# Patient Record
Sex: FEMALE | Race: WHITE | HISPANIC OR LATINO | Employment: UNEMPLOYED | URBAN - METROPOLITAN AREA
[De-identification: names, ages, dates, MRNs, and addresses within clinical notes are randomized per-mention and may not be internally consistent; named-entity substitution may affect disease eponyms.]

---

## 2017-01-09 ENCOUNTER — APPOINTMENT (OUTPATIENT)
Dept: LAB | Facility: HOSPITAL | Age: 37
End: 2017-01-09
Payer: COMMERCIAL

## 2017-01-09 ENCOUNTER — ALLSCRIPTS OFFICE VISIT (OUTPATIENT)
Dept: PERINATAL CARE | Facility: CLINIC | Age: 37
End: 2017-01-09
Payer: COMMERCIAL

## 2017-01-09 ENCOUNTER — GENERIC CONVERSION - ENCOUNTER (OUTPATIENT)
Dept: OTHER | Facility: OTHER | Age: 37
End: 2017-01-09

## 2017-01-09 DIAGNOSIS — O09.512 SUPERVISION OF ELDERLY PRIMIGRAVIDA IN SECOND TRIMESTER: ICD-10-CM

## 2017-01-09 LAB
BASOPHILS # BLD AUTO: 0.02 THOUSANDS/ΜL (ref 0–0.1)
BASOPHILS NFR BLD AUTO: 0 % (ref 0–1)
EOSINOPHIL # BLD AUTO: 0.11 THOUSAND/ΜL (ref 0–0.61)
EOSINOPHIL NFR BLD AUTO: 1 % (ref 0–6)
ERYTHROCYTE [DISTWIDTH] IN BLOOD BY AUTOMATED COUNT: 13.3 % (ref 11.6–15.1)
GLUCOSE 1H P 50 G GLC PO SERPL-MCNC: 149 MG/DL
HCT VFR BLD AUTO: 36.9 % (ref 34.8–46.1)
HGB BLD-MCNC: 12.2 G/DL (ref 11.5–15.4)
LYMPHOCYTES # BLD AUTO: 1.54 THOUSANDS/ΜL (ref 0.6–4.47)
LYMPHOCYTES NFR BLD AUTO: 14 % (ref 14–44)
MCH RBC QN AUTO: 30.1 PG (ref 26.8–34.3)
MCHC RBC AUTO-ENTMCNC: 33.1 G/DL (ref 31.4–37.4)
MCV RBC AUTO: 91 FL (ref 82–98)
MONOCYTES # BLD AUTO: 0.77 THOUSAND/ΜL (ref 0.17–1.22)
MONOCYTES NFR BLD AUTO: 7 % (ref 4–12)
NEUTROPHILS # BLD AUTO: 8.96 THOUSANDS/ΜL (ref 1.85–7.62)
NEUTS SEG NFR BLD AUTO: 78 % (ref 43–75)
NRBC BLD AUTO-RTO: 0 /100 WBCS
PLATELET # BLD AUTO: 246 THOUSANDS/UL (ref 149–390)
PMV BLD AUTO: 10.2 FL (ref 8.9–12.7)
RBC # BLD AUTO: 4.05 MILLION/UL (ref 3.81–5.12)
WBC # BLD AUTO: 11.4 THOUSAND/UL (ref 4.31–10.16)

## 2017-01-09 PROCEDURE — 36415 COLL VENOUS BLD VENIPUNCTURE: CPT

## 2017-01-09 PROCEDURE — 86592 SYPHILIS TEST NON-TREP QUAL: CPT

## 2017-01-09 PROCEDURE — 82950 GLUCOSE TEST: CPT

## 2017-01-09 PROCEDURE — 85025 COMPLETE CBC W/AUTO DIFF WBC: CPT

## 2017-01-09 PROCEDURE — 76816 OB US FOLLOW-UP PER FETUS: CPT | Performed by: OBSTETRICS & GYNECOLOGY

## 2017-01-10 LAB — RPR SER QL: NORMAL

## 2017-01-16 ENCOUNTER — GENERIC CONVERSION - ENCOUNTER (OUTPATIENT)
Dept: OTHER | Facility: OTHER | Age: 37
End: 2017-01-16

## 2017-01-18 ENCOUNTER — APPOINTMENT (OUTPATIENT)
Dept: LAB | Facility: HOSPITAL | Age: 37
End: 2017-01-18
Payer: COMMERCIAL

## 2017-01-18 ENCOUNTER — GENERIC CONVERSION - ENCOUNTER (OUTPATIENT)
Dept: OTHER | Facility: OTHER | Age: 37
End: 2017-01-18

## 2017-01-18 DIAGNOSIS — O99.810 ABNORMAL GLUCOSE COMPLICATING PREGNANCY: ICD-10-CM

## 2017-01-18 DIAGNOSIS — Z34.83 ENCOUNTER FOR SUPERVISION OF OTHER NORMAL PREGNANCY, THIRD TRIMESTER: ICD-10-CM

## 2017-01-18 LAB
GLUCOSE 1H P 100 G GLC PO SERPL-MCNC: 165 MG/DL (ref 65–179)
GLUCOSE 2H P 100 G GLC PO SERPL-MCNC: 169 MG/DL (ref 65–154)
GLUCOSE 3H P 100 G GLC PO SERPL-MCNC: 140 MG/DL (ref 65–139)
GLUCOSE P FAST SERPL-MCNC: 86 MG/DL (ref 65–99)

## 2017-01-18 PROCEDURE — 82952 GTT-ADDED SAMPLES: CPT

## 2017-01-18 PROCEDURE — 36415 COLL VENOUS BLD VENIPUNCTURE: CPT

## 2017-01-18 PROCEDURE — 82951 GLUCOSE TOLERANCE TEST (GTT): CPT

## 2017-01-30 ENCOUNTER — APPOINTMENT (OUTPATIENT)
Dept: PERINATAL CARE | Facility: CLINIC | Age: 37
End: 2017-01-30
Payer: COMMERCIAL

## 2017-01-30 ENCOUNTER — ALLSCRIPTS OFFICE VISIT (OUTPATIENT)
Dept: OTHER | Facility: OTHER | Age: 37
End: 2017-01-30

## 2017-01-30 ENCOUNTER — GENERIC CONVERSION - ENCOUNTER (OUTPATIENT)
Dept: OTHER | Facility: OTHER | Age: 37
End: 2017-01-30

## 2017-01-30 DIAGNOSIS — M79.662 PAIN OF LEFT LOWER LEG: ICD-10-CM

## 2017-01-30 DIAGNOSIS — I83.90 ASYMPTOMATIC VARICOSE VEINS OF LOWER EXTREMITY: ICD-10-CM

## 2017-01-30 PROCEDURE — G0108 DIAB MANAGE TRN  PER INDIV: HCPCS | Performed by: OBSTETRICS & GYNECOLOGY

## 2017-02-06 ENCOUNTER — APPOINTMENT (OUTPATIENT)
Dept: PERINATAL CARE | Facility: CLINIC | Age: 37
End: 2017-02-06
Payer: COMMERCIAL

## 2017-02-06 PROCEDURE — G0108 DIAB MANAGE TRN  PER INDIV: HCPCS | Performed by: OBSTETRICS & GYNECOLOGY

## 2017-02-07 ENCOUNTER — HOSPITAL ENCOUNTER (OUTPATIENT)
Dept: ULTRASOUND IMAGING | Facility: HOSPITAL | Age: 37
Discharge: HOME | End: 2017-02-07
Payer: COMMERCIAL

## 2017-02-07 DIAGNOSIS — I83.90 ASYMPTOMATIC VARICOSE VEINS OF LOWER EXTREMITY: ICD-10-CM

## 2017-02-07 DIAGNOSIS — M79.662 PAIN OF LEFT LOWER LEG: ICD-10-CM

## 2017-02-07 PROCEDURE — 93970 EXTREMITY STUDY: CPT

## 2017-02-13 ENCOUNTER — GENERIC CONVERSION - ENCOUNTER (OUTPATIENT)
Dept: OTHER | Facility: OTHER | Age: 37
End: 2017-02-13

## 2017-02-20 ENCOUNTER — ALLSCRIPTS OFFICE VISIT (OUTPATIENT)
Dept: PERINATAL CARE | Facility: CLINIC | Age: 37
End: 2017-02-20
Payer: COMMERCIAL

## 2017-02-20 ENCOUNTER — GENERIC CONVERSION - ENCOUNTER (OUTPATIENT)
Dept: OTHER | Facility: OTHER | Age: 37
End: 2017-02-20

## 2017-02-20 PROCEDURE — 76816 OB US FOLLOW-UP PER FETUS: CPT | Performed by: OBSTETRICS & GYNECOLOGY

## 2017-02-27 ENCOUNTER — GENERIC CONVERSION - ENCOUNTER (OUTPATIENT)
Dept: OTHER | Facility: OTHER | Age: 37
End: 2017-02-27

## 2017-03-13 ENCOUNTER — GENERIC CONVERSION - ENCOUNTER (OUTPATIENT)
Dept: OTHER | Facility: OTHER | Age: 37
End: 2017-03-13

## 2017-03-13 ENCOUNTER — APPOINTMENT (OUTPATIENT)
Dept: LAB | Facility: HOSPITAL | Age: 37
End: 2017-03-13
Payer: COMMERCIAL

## 2017-03-13 DIAGNOSIS — Z34.83 ENCOUNTER FOR SUPERVISION OF OTHER NORMAL PREGNANCY, THIRD TRIMESTER: ICD-10-CM

## 2017-03-13 PROCEDURE — 87653 STREP B DNA AMP PROBE: CPT

## 2017-03-14 LAB — GP B STREP DNA SPEC QL NAA+PROBE: NORMAL

## 2017-03-20 ENCOUNTER — ALLSCRIPTS OFFICE VISIT (OUTPATIENT)
Dept: PERINATAL CARE | Facility: CLINIC | Age: 37
End: 2017-03-20
Payer: COMMERCIAL

## 2017-03-20 ENCOUNTER — GENERIC CONVERSION - ENCOUNTER (OUTPATIENT)
Dept: OTHER | Facility: OTHER | Age: 37
End: 2017-03-20

## 2017-03-20 ENCOUNTER — APPOINTMENT (OUTPATIENT)
Dept: PERINATAL CARE | Facility: CLINIC | Age: 37
End: 2017-03-20
Payer: COMMERCIAL

## 2017-03-20 PROCEDURE — 76816 OB US FOLLOW-UP PER FETUS: CPT | Performed by: OBSTETRICS & GYNECOLOGY

## 2017-03-20 PROCEDURE — G0108 DIAB MANAGE TRN  PER INDIV: HCPCS | Performed by: OBSTETRICS & GYNECOLOGY

## 2017-03-27 ENCOUNTER — ALLSCRIPTS OFFICE VISIT (OUTPATIENT)
Dept: OTHER | Facility: OTHER | Age: 37
End: 2017-03-27

## 2017-03-27 LAB
GLUCOSE (HISTORICAL): NORMAL
PROT UR STRIP-MCNC: NORMAL MG/DL

## 2017-04-03 ENCOUNTER — GENERIC CONVERSION - ENCOUNTER (OUTPATIENT)
Dept: OTHER | Facility: OTHER | Age: 37
End: 2017-04-03

## 2017-04-10 ENCOUNTER — HOSPITAL ENCOUNTER (INPATIENT)
Facility: HOSPITAL | Age: 37
LOS: 2 days | Discharge: HOME | End: 2017-04-12
Attending: OBSTETRICS & GYNECOLOGY | Admitting: OBSTETRICS & GYNECOLOGY
Payer: COMMERCIAL

## 2017-04-10 PROBLEM — Z3A.37 37 WEEKS GESTATION OF PREGNANCY: Status: ACTIVE | Noted: 2017-04-10

## 2017-04-10 PROBLEM — Z3A.40 40 WEEKS GESTATION OF PREGNANCY: Status: ACTIVE | Noted: 2017-04-10

## 2017-04-10 PROBLEM — O42.90 PROM (PREMATURE RUPTURE OF MEMBRANES): Status: ACTIVE | Noted: 2017-04-10

## 2017-04-10 LAB
ABO GROUP BLD: NORMAL
BASE EXCESS BLDCOV CALC-SCNC: -0.2 MMOL/L (ref 1–9)
BASOPHILS # BLD AUTO: 0.01 THOUSANDS/ΜL (ref 0–0.1)
BASOPHILS NFR BLD AUTO: 0 % (ref 0–1)
BLD GP AB SCN SERPL QL: NEGATIVE
EOSINOPHIL # BLD AUTO: 0.03 THOUSAND/ΜL (ref 0–0.61)
EOSINOPHIL NFR BLD AUTO: 0 % (ref 0–6)
ERYTHROCYTE [DISTWIDTH] IN BLOOD BY AUTOMATED COUNT: 14.5 % (ref 11.6–15.1)
HCO3 BLDCOV-SCNC: 23.5 MMOL/L (ref 12.2–28.6)
HCT VFR BLD AUTO: 42.6 % (ref 34.8–46.1)
HGB BLD-MCNC: 14.6 G/DL (ref 11.5–15.4)
LYMPHOCYTES # BLD AUTO: 1.44 THOUSANDS/ΜL (ref 0.6–4.47)
LYMPHOCYTES NFR BLD AUTO: 14 % (ref 14–44)
MCH RBC QN AUTO: 30.4 PG (ref 26.8–34.3)
MCHC RBC AUTO-ENTMCNC: 34.3 G/DL (ref 31.4–37.4)
MCV RBC AUTO: 89 FL (ref 82–98)
MONOCYTES # BLD AUTO: 0.59 THOUSAND/ΜL (ref 0.17–1.22)
MONOCYTES NFR BLD AUTO: 6 % (ref 4–12)
NEUTROPHILS # BLD AUTO: 8.13 THOUSANDS/ΜL (ref 1.85–7.62)
NEUTS SEG NFR BLD AUTO: 80 % (ref 43–75)
NRBC BLD AUTO-RTO: 0 /100 WBCS
OXYHGB MFR BLDCOV: 62.5 %
PCO2 BLDCOV: 35.7 MM HG (ref 27–43)
PH BLDCOV: 7.44 [PH] (ref 7.19–7.49)
PLATELET # BLD AUTO: 215 THOUSANDS/UL (ref 149–390)
PMV BLD AUTO: 10.7 FL (ref 8.9–12.7)
PO2 BLDCOV: 25.2 MM HG (ref 15–45)
RBC # BLD AUTO: 4.8 MILLION/UL (ref 3.81–5.12)
RH BLD: POSITIVE
SAO2 % BLDCOV: 12.8 ML/DL
WBC # BLD AUTO: 10.2 THOUSAND/UL (ref 4.31–10.16)

## 2017-04-10 PROCEDURE — 0UQG7ZZ REPAIR VAGINA, VIA NATURAL OR ARTIFICIAL OPENING: ICD-10-PCS | Performed by: OBSTETRICS & GYNECOLOGY

## 2017-04-10 PROCEDURE — 4A1HXCZ MONITORING OF PRODUCTS OF CONCEPTION, CARDIAC RATE, EXTERNAL APPROACH: ICD-10-PCS | Performed by: OBSTETRICS & GYNECOLOGY

## 2017-04-10 PROCEDURE — 85025 COMPLETE CBC W/AUTO DIFF WBC: CPT | Performed by: OBSTETRICS & GYNECOLOGY

## 2017-04-10 PROCEDURE — 99203 OFFICE O/P NEW LOW 30 MIN: CPT

## 2017-04-10 PROCEDURE — 86901 BLOOD TYPING SEROLOGIC RH(D): CPT | Performed by: OBSTETRICS & GYNECOLOGY

## 2017-04-10 PROCEDURE — 86592 SYPHILIS TEST NON-TREP QUAL: CPT | Performed by: OBSTETRICS & GYNECOLOGY

## 2017-04-10 PROCEDURE — 86900 BLOOD TYPING SEROLOGIC ABO: CPT | Performed by: OBSTETRICS & GYNECOLOGY

## 2017-04-10 PROCEDURE — 82805 BLOOD GASES W/O2 SATURATION: CPT | Performed by: OBSTETRICS & GYNECOLOGY

## 2017-04-10 PROCEDURE — 86850 RBC ANTIBODY SCREEN: CPT | Performed by: OBSTETRICS & GYNECOLOGY

## 2017-04-10 RX ORDER — SODIUM CHLORIDE 9 MG/ML
125 INJECTION, SOLUTION INTRAVENOUS CONTINUOUS
Status: DISCONTINUED | OUTPATIENT
Start: 2017-04-10 | End: 2017-04-10

## 2017-04-10 RX ORDER — CALCIUM CARBONATE 200(500)MG
1000 TABLET,CHEWABLE ORAL DAILY PRN
Status: DISCONTINUED | OUTPATIENT
Start: 2017-04-10 | End: 2017-04-12 | Stop reason: HOSPADM

## 2017-04-10 RX ORDER — DIAPER,BRIEF,INFANT-TODD,DISP
1 EACH MISCELLANEOUS AS NEEDED
Status: DISCONTINUED | OUTPATIENT
Start: 2017-04-10 | End: 2017-04-12 | Stop reason: HOSPADM

## 2017-04-10 RX ORDER — PNV 119/IRON FUM/FOLIC ACID 29 MG-1 MG
TABLET ORAL
COMMUNITY
Start: 2016-09-21

## 2017-04-10 RX ORDER — ACETAMINOPHEN 325 MG/1
650 TABLET ORAL EVERY 6 HOURS PRN
Status: DISCONTINUED | OUTPATIENT
Start: 2017-04-10 | End: 2017-04-12 | Stop reason: HOSPADM

## 2017-04-10 RX ORDER — DOCUSATE SODIUM 100 MG/1
100 CAPSULE, LIQUID FILLED ORAL 2 TIMES DAILY
Status: DISCONTINUED | OUTPATIENT
Start: 2017-04-10 | End: 2017-04-12 | Stop reason: HOSPADM

## 2017-04-10 RX ORDER — OXYCODONE HYDROCHLORIDE AND ACETAMINOPHEN 5; 325 MG/1; MG/1
1 TABLET ORAL EVERY 4 HOURS PRN
Status: DISCONTINUED | OUTPATIENT
Start: 2017-04-10 | End: 2017-04-12 | Stop reason: HOSPADM

## 2017-04-10 RX ORDER — OXYCODONE HYDROCHLORIDE AND ACETAMINOPHEN 5; 325 MG/1; MG/1
2 TABLET ORAL EVERY 4 HOURS PRN
Status: DISCONTINUED | OUTPATIENT
Start: 2017-04-10 | End: 2017-04-12 | Stop reason: HOSPADM

## 2017-04-10 RX ORDER — IBUPROFEN 600 MG/1
600 TABLET ORAL EVERY 6 HOURS PRN
Status: DISCONTINUED | OUTPATIENT
Start: 2017-04-10 | End: 2017-04-12 | Stop reason: HOSPADM

## 2017-04-10 RX ORDER — ONDANSETRON 2 MG/ML
4 INJECTION INTRAMUSCULAR; INTRAVENOUS EVERY 8 HOURS PRN
Status: DISCONTINUED | OUTPATIENT
Start: 2017-04-10 | End: 2017-04-12 | Stop reason: HOSPADM

## 2017-04-10 RX ORDER — DIPHENHYDRAMINE HCL 25 MG
25 TABLET ORAL EVERY 6 HOURS PRN
Status: DISCONTINUED | OUTPATIENT
Start: 2017-04-10 | End: 2017-04-12 | Stop reason: HOSPADM

## 2017-04-10 RX ADMIN — SODIUM CHLORIDE 125 ML/HR: 0.9 INJECTION, SOLUTION INTRAVENOUS at 13:46

## 2017-04-11 LAB — RPR SER QL: NORMAL

## 2017-04-11 RX ADMIN — IBUPROFEN 600 MG: 600 TABLET, FILM COATED ORAL at 06:21

## 2017-04-11 RX ADMIN — DOCUSATE SODIUM 100 MG: 100 CAPSULE, LIQUID FILLED ORAL at 09:30

## 2017-04-12 VITALS
HEIGHT: 61 IN | TEMPERATURE: 98.3 F | RESPIRATION RATE: 18 BRPM | OXYGEN SATURATION: 98 % | HEART RATE: 69 BPM | SYSTOLIC BLOOD PRESSURE: 126 MMHG | BODY MASS INDEX: 25.86 KG/M2 | WEIGHT: 137 LBS | DIASTOLIC BLOOD PRESSURE: 77 MMHG

## 2017-04-12 RX ORDER — DOCUSATE SODIUM 100 MG/1
100 CAPSULE, LIQUID FILLED ORAL 2 TIMES DAILY
Qty: 60 CAPSULE | Refills: 0
Start: 2017-04-12 | End: 2017-05-12

## 2017-04-12 RX ORDER — MEDROXYPROGESTERONE ACETATE 150 MG/ML
150 INJECTION, SUSPENSION INTRAMUSCULAR ONCE
Status: DISCONTINUED | OUTPATIENT
Start: 2017-04-12 | End: 2017-04-12 | Stop reason: HOSPADM

## 2017-04-12 RX ORDER — DIAPER,BRIEF,INFANT-TODD,DISP
1 EACH MISCELLANEOUS AS NEEDED
Qty: 30 G | Refills: 0
Start: 2017-04-12 | End: 2017-05-12

## 2017-04-12 RX ORDER — ACETAMINOPHEN 325 MG/1
650 TABLET ORAL EVERY 4 HOURS PRN
Qty: 30 TABLET | Refills: 0
Start: 2017-04-12

## 2017-04-12 RX ORDER — IBUPROFEN 600 MG/1
600 TABLET ORAL EVERY 6 HOURS PRN
Qty: 30 TABLET | Refills: 0
Start: 2017-04-12 | End: 2017-05-12

## 2017-04-14 ENCOUNTER — GENERIC CONVERSION - ENCOUNTER (OUTPATIENT)
Dept: OTHER | Facility: OTHER | Age: 37
End: 2017-04-14

## 2017-04-19 LAB — PLACENTA IN STORAGE: NORMAL

## 2017-05-04 ENCOUNTER — GENERIC CONVERSION - ENCOUNTER (OUTPATIENT)
Dept: OTHER | Facility: OTHER | Age: 37
End: 2017-05-04

## 2017-05-04 ENCOUNTER — ALLSCRIPTS OFFICE VISIT (OUTPATIENT)
Dept: OTHER | Facility: OTHER | Age: 37
End: 2017-05-04

## 2017-05-04 DIAGNOSIS — O24.410 GESTATIONAL DIABETES MELLITUS, DIET-CONTROLLED: ICD-10-CM

## 2017-05-04 DIAGNOSIS — O99.810 ABNORMAL GLUCOSE COMPLICATING PREGNANCY: ICD-10-CM

## 2017-05-12 ENCOUNTER — ALLSCRIPTS OFFICE VISIT (OUTPATIENT)
Dept: OTHER | Facility: OTHER | Age: 37
End: 2017-05-12

## 2017-05-20 ENCOUNTER — APPOINTMENT (OUTPATIENT)
Dept: LAB | Facility: HOSPITAL | Age: 37
End: 2017-05-20
Payer: COMMERCIAL

## 2017-05-20 DIAGNOSIS — O99.810 ABNORMAL GLUCOSE COMPLICATING PREGNANCY: ICD-10-CM

## 2017-05-20 LAB
GLUCOSE 2H P 75 G GLC PO SERPL-MCNC: 115 MG/DL (ref 70–183)
GLUCOSE P FAST SERPL-MCNC: 95 MG/DL (ref 65–99)

## 2017-05-20 PROCEDURE — 82947 ASSAY GLUCOSE BLOOD QUANT: CPT

## 2017-05-20 PROCEDURE — 82950 GLUCOSE TEST: CPT

## 2017-05-20 PROCEDURE — 36415 COLL VENOUS BLD VENIPUNCTURE: CPT

## 2017-06-28 ENCOUNTER — APPOINTMENT (OUTPATIENT)
Dept: LAB | Facility: HOSPITAL | Age: 37
End: 2017-06-28
Payer: COMMERCIAL

## 2017-06-28 ENCOUNTER — ALLSCRIPTS OFFICE VISIT (OUTPATIENT)
Dept: OTHER | Facility: OTHER | Age: 37
End: 2017-06-28

## 2017-06-28 DIAGNOSIS — O99.810 ABNORMAL GLUCOSE COMPLICATING PREGNANCY: ICD-10-CM

## 2017-06-28 DIAGNOSIS — M25.50 PAIN IN JOINT: ICD-10-CM

## 2017-06-28 LAB
25(OH)D3 SERPL-MCNC: 22.9 NG/ML (ref 30–100)
ALBUMIN SERPL BCP-MCNC: 4 G/DL (ref 3.5–5)
ALP SERPL-CCNC: 155 U/L (ref 46–116)
ALT SERPL W P-5'-P-CCNC: 41 U/L (ref 12–78)
ANION GAP SERPL CALCULATED.3IONS-SCNC: 8 MMOL/L (ref 4–13)
AST SERPL W P-5'-P-CCNC: 21 U/L (ref 5–45)
BASOPHILS # BLD AUTO: 0.03 THOUSANDS/ΜL (ref 0–0.1)
BASOPHILS NFR BLD AUTO: 1 % (ref 0–1)
BILIRUB SERPL-MCNC: 0.77 MG/DL (ref 0.2–1)
BUN SERPL-MCNC: 17 MG/DL (ref 5–25)
CALCIUM SERPL-MCNC: 9.1 MG/DL (ref 8.3–10.1)
CHLORIDE SERPL-SCNC: 104 MMOL/L (ref 100–108)
CO2 SERPL-SCNC: 27 MMOL/L (ref 21–32)
CREAT SERPL-MCNC: 0.52 MG/DL (ref 0.6–1.3)
CRP SERPL QL: 6.2 MG/L
EOSINOPHIL # BLD AUTO: 0.07 THOUSAND/ΜL (ref 0–0.61)
EOSINOPHIL NFR BLD AUTO: 1 % (ref 0–6)
ERYTHROCYTE [DISTWIDTH] IN BLOOD BY AUTOMATED COUNT: 13.9 % (ref 11.6–15.1)
ERYTHROCYTE [SEDIMENTATION RATE] IN BLOOD: 38 MM/HOUR (ref 0–20)
GFR SERPL CREATININE-BSD FRML MDRD: >60 ML/MIN/1.73SQ M
GLUCOSE P FAST SERPL-MCNC: 94 MG/DL (ref 65–99)
HCT VFR BLD AUTO: 34.4 % (ref 34.8–46.1)
HGB BLD-MCNC: 11.9 G/DL (ref 11.5–15.4)
LYMPHOCYTES # BLD AUTO: 1.42 THOUSANDS/ΜL (ref 0.6–4.47)
LYMPHOCYTES NFR BLD AUTO: 22 % (ref 14–44)
MCH RBC QN AUTO: 30.1 PG (ref 26.8–34.3)
MCHC RBC AUTO-ENTMCNC: 34.6 G/DL (ref 31.4–37.4)
MCV RBC AUTO: 87 FL (ref 82–98)
MONOCYTES # BLD AUTO: 0.49 THOUSAND/ΜL (ref 0.17–1.22)
MONOCYTES NFR BLD AUTO: 8 % (ref 4–12)
NEUTROPHILS # BLD AUTO: 4.35 THOUSANDS/ΜL (ref 1.85–7.62)
NEUTS SEG NFR BLD AUTO: 68 % (ref 43–75)
NRBC BLD AUTO-RTO: 0 /100 WBCS
PLATELET # BLD AUTO: 301 THOUSANDS/UL (ref 149–390)
PMV BLD AUTO: 9.6 FL (ref 8.9–12.7)
POTASSIUM SERPL-SCNC: 3.8 MMOL/L (ref 3.5–5.3)
PROT SERPL-MCNC: 8 G/DL (ref 6.4–8.2)
RBC # BLD AUTO: 3.96 MILLION/UL (ref 3.81–5.12)
SODIUM SERPL-SCNC: 139 MMOL/L (ref 136–145)
TSH SERPL DL<=0.05 MIU/L-ACNC: 1.86 UIU/ML (ref 0.36–3.74)
WBC # BLD AUTO: 6.36 THOUSAND/UL (ref 4.31–10.16)

## 2017-06-28 PROCEDURE — 86140 C-REACTIVE PROTEIN: CPT

## 2017-06-28 PROCEDURE — 84443 ASSAY THYROID STIM HORMONE: CPT

## 2017-06-28 PROCEDURE — 86038 ANTINUCLEAR ANTIBODIES: CPT

## 2017-06-28 PROCEDURE — 80053 COMPREHEN METABOLIC PANEL: CPT

## 2017-06-28 PROCEDURE — 86430 RHEUMATOID FACTOR TEST QUAL: CPT

## 2017-06-28 PROCEDURE — 85025 COMPLETE CBC W/AUTO DIFF WBC: CPT

## 2017-06-28 PROCEDURE — 36415 COLL VENOUS BLD VENIPUNCTURE: CPT

## 2017-06-28 PROCEDURE — 86617 LYME DISEASE ANTIBODY: CPT

## 2017-06-28 PROCEDURE — 82306 VITAMIN D 25 HYDROXY: CPT

## 2017-06-28 PROCEDURE — 85652 RBC SED RATE AUTOMATED: CPT

## 2017-06-28 PROCEDURE — 86431 RHEUMATOID FACTOR QUANT: CPT

## 2017-06-29 LAB
B BURGDOR IGG PATRN SER IB-IMP: NEGATIVE
B BURGDOR IGM PATRN SER IB-IMP: NEGATIVE
B BURGDOR18KD IGG SER QL IB: ABNORMAL
B BURGDOR23KD IGG SER QL IB: PRESENT
B BURGDOR23KD IGM SER QL IB: ABNORMAL
B BURGDOR28KD IGG SER QL IB: ABNORMAL
B BURGDOR30KD IGG SER QL IB: PRESENT
B BURGDOR39KD IGG SER QL IB: ABNORMAL
B BURGDOR39KD IGM SER QL IB: ABNORMAL
B BURGDOR41KD IGG SER QL IB: ABNORMAL
B BURGDOR41KD IGM SER QL IB: PRESENT
B BURGDOR45KD IGG SER QL IB: ABNORMAL
B BURGDOR58KD IGG SER QL IB: ABNORMAL
B BURGDOR66KD IGG SER QL IB: ABNORMAL
B BURGDOR93KD IGG SER QL IB: ABNORMAL
CRYOGLOB RF SER-ACNC: ABNORMAL [IU]/ML
RHEUMATOID FACT SER QL LA: POSITIVE

## 2017-06-30 LAB — RYE IGE QN: NEGATIVE

## 2017-07-12 ENCOUNTER — TRANSCRIBE ORDERS (OUTPATIENT)
Dept: ADMINISTRATIVE | Facility: HOSPITAL | Age: 37
End: 2017-07-12

## 2017-07-12 ENCOUNTER — APPOINTMENT (OUTPATIENT)
Dept: LAB | Facility: HOSPITAL | Age: 37
End: 2017-07-12
Payer: COMMERCIAL

## 2017-07-12 ENCOUNTER — HOSPITAL ENCOUNTER (OUTPATIENT)
Dept: RADIOLOGY | Facility: HOSPITAL | Age: 37
Discharge: HOME | End: 2017-07-12
Payer: COMMERCIAL

## 2017-07-12 DIAGNOSIS — M25.50 PAIN IN JOINT, SITE UNSPECIFIED: Primary | ICD-10-CM

## 2017-07-12 DIAGNOSIS — M25.50 PAIN IN JOINT, SITE UNSPECIFIED: ICD-10-CM

## 2017-07-12 DIAGNOSIS — R76.8 FALSE POSITIVE SEROLOGICAL TEST FOR SYPHILIS: ICD-10-CM

## 2017-07-12 PROCEDURE — 36415 COLL VENOUS BLD VENIPUNCTURE: CPT

## 2017-07-12 PROCEDURE — 86200 CCP ANTIBODY: CPT

## 2017-07-12 PROCEDURE — 87340 HEPATITIS B SURFACE AG IA: CPT

## 2017-07-12 PROCEDURE — 73130 X-RAY EXAM OF HAND: CPT

## 2017-07-12 PROCEDURE — 86480 TB TEST CELL IMMUN MEASURE: CPT

## 2017-07-12 PROCEDURE — 82955 ASSAY OF G6PD ENZYME: CPT

## 2017-07-12 PROCEDURE — 86803 HEPATITIS C AB TEST: CPT

## 2017-07-13 LAB
HBV SURFACE AG SER QL: NORMAL
HCV AB SER QL: NORMAL

## 2017-07-14 LAB
CCP IGA+IGG SERPL IA-ACNC: >250 UNITS (ref 0–19)
G6PD BLD QN: 203 U/10E12 RBC (ref 146–376)
RBC # BLD AUTO: 3.78 X10E6/UL (ref 3.77–5.28)

## 2017-07-15 LAB
ANNOTATION COMMENT IMP: ABNORMAL
GAMMA INTERFERON BACKGROUND BLD IA-ACNC: 0.17 IU/ML
M TB IFN-G BLD-IMP: POSITIVE
M TB IFN-G CD4+ BCKGRND COR BLD-ACNC: 5.67 IU/ML
M TB IFN-G CD4+ T-CELLS BLD-ACNC: 5.84 IU/ML
MITOGEN IGNF BLD-ACNC: >10 IU/ML
QUANTIFERON-TB GOLD IN TUBE: NORMAL
SERVICE CMNT-IMP: ABNORMAL

## 2017-08-05 ENCOUNTER — APPOINTMENT (OUTPATIENT)
Dept: LAB | Facility: HOSPITAL | Age: 37
End: 2017-08-05
Payer: COMMERCIAL

## 2017-08-05 ENCOUNTER — TRANSCRIBE ORDERS (OUTPATIENT)
Dept: ADMINISTRATIVE | Facility: HOSPITAL | Age: 37
End: 2017-08-05

## 2017-08-05 DIAGNOSIS — R74.8 ELEVATED ALKALINE PHOSPHATASE LEVEL: Primary | ICD-10-CM

## 2017-08-05 DIAGNOSIS — R74.8 ELEVATED ALKALINE PHOSPHATASE LEVEL: ICD-10-CM

## 2017-08-05 LAB
ALBUMIN SERPL BCP-MCNC: 3.8 G/DL (ref 3.5–5)
ALP SERPL-CCNC: 92 U/L (ref 46–116)
ALT SERPL W P-5'-P-CCNC: 24 U/L (ref 12–78)
AST SERPL W P-5'-P-CCNC: 11 U/L (ref 5–45)
BILIRUB DIRECT SERPL-MCNC: 0.18 MG/DL (ref 0–0.2)
BILIRUB SERPL-MCNC: 1.06 MG/DL (ref 0.2–1)
PROT SERPL-MCNC: 7.5 G/DL (ref 6.4–8.2)

## 2017-08-05 PROCEDURE — 80076 HEPATIC FUNCTION PANEL: CPT

## 2017-08-05 PROCEDURE — 36415 COLL VENOUS BLD VENIPUNCTURE: CPT

## 2017-09-26 ENCOUNTER — ALLSCRIPTS OFFICE VISIT (OUTPATIENT)
Dept: OTHER | Facility: OTHER | Age: 37
End: 2017-09-26

## 2017-09-26 LAB — HCG, QUALITATIVE (HISTORICAL): NEGATIVE

## 2017-10-26 ENCOUNTER — GENERIC CONVERSION - ENCOUNTER (OUTPATIENT)
Dept: OTHER | Facility: OTHER | Age: 37
End: 2017-10-26

## 2018-01-11 NOTE — PROGRESS NOTES
Assessment    1  Cervical cancer screening (V76 2) (Z12 4)   2  's permit PE (physical examination) (V70 3) (Z02 4)   3  Allergic conjunctivitis of both eyes (372 14) (H10 13)    Plan  Allergic conjunctivitis of both eyes    · Olopatadine HCl - 0 1 % Ophthalmic Solution (Patanol); INSTILL 1 DROP INTO  AFFECTED EYE(S) TWICE DAILY AS DIRECTED  Episode of dizziness    · (1) CBC/PLT/DIFF; Status:Active; Requested for:29Knv1267;    · (1) COMPREHENSIVE METABOLIC PANEL; Status:Active; Requested for:56Fjr0194;    · (1) TSH; Status:Active; Requested for:92Xss2218;     Discussion/Summary  health maintenance visit Currently, she eats a healthy diet and has an adequate exercise regimen  referred for screem Breast cancer screening: breast cancer screening is not indicated  Colorectal cancer screening: colorectal cancer screening is not indicated  Screening lab work includes hemoglobin, glucose and thyroid function testing  She was advised to be evaluated by an optometrist and a dentist  Patient discussion: discussed with the patient  WE will get labs for dizziness  I recommend she see gynecology for pap and for irregular menses  Possible side effects of new medications were reviewed with the patient/guardian today  The patient was counseled regarding instructions for management, impressions  Self Referrals: No      Chief Complaint  38 y/o well visit for DL permit      History of Present Illness  HM, Adult Female: The patient is being seen for a health maintenance evaluation  The last health maintenance visit was many year(s) ago  General Health: The patient's health since the last visit is described as good  She has regular dental visits  She complains of vision problems  The patient complains of sometimes blurry  She denies hearing loss  Lifestyle:  She consumes a diverse and healthy diet  She does not use tobacco  She denies alcohol use  She denies drug use     Reproductive health:  she reports abnormal menses  (periods used to be heavy and now is light  )   she uses no contraception  pregnancy history:  Screening: cancer screening reviewed and current  metabolic screening reviewed and current  risk screening reviewed and current  HPI: 40 y/o F here for drivers permit physical  SHe did feel dizzy and had congestion but she is feeling better today  She has had this dizziness on /off int the past      Review of Systems    Constitutional: No fever, no chills, feels well, no tiredness, no recent weight gain or weight loss  Eyes: red eyes, purulent discharge from the eyes and itching of the eyes, but No complaints of eye pain, no red eyes, no eyesight problems, no discharge, no dry eyes, no itching of eyes    The patient presents with complaints of mild eyesight problems, described as blurry vision  ENT: no complaints of earache, no loss of hearing, no nose bleeds, no nasal discharge, no sore throat, no hoarseness  Cardiovascular: No complaints of slow heart rate, no fast heart rate, no chest pain, no palpitations, no leg claudication, no lower extremity edema  Respiratory: No complaints of shortness of breath, no wheezing, no cough, no SOB on exertion, no orthopnea, no PND  Gastrointestinal: No complaints of abdominal pain, no constipation, no nausea or vomiting, no diarrhea, no bloody stools  Genitourinary: No complaints of dysuria, no incontinence, no pelvic pain, no dysmenorrhea, no vaginal discharge or bleeding    The patient presents with complaints of pelvic pain (this began with changes in her period)  Musculoskeletal: No complaints of arthralgias, no myalgias, no joint swelling or stiffness, no limb pain or swelling  Integumentary: No complaints of skin rash or lesions, no itching, no skin wounds, no breast pain or lump     Neurological: dizziness, but No complaints of headache, no confusion, no convulsions, no numbness, no dizziness or fainting, no tingling, no limb weakness, no difficulty walking  Psychiatric: Not suicidal, no sleep disturbance, no anxiety or depression, no change in personality, no emotional problems  Endocrine: No complaints of proptosis, no hot flashes, no muscle weakness, no deepening of the voice, no feelings of weakness  Hematologic/Lymphatic: No complaints of swollen glands, no swollen glands in the neck, does not bleed easily, does not bruise easily  Active Problems    1  Cervical cancer screening (V76 2) (Z12 4)   2  Dysuria (788 1) (R30 0)    Surgical History    · History of Appendectomy    Family History  Mother    · Family history of hypertension (V17 49) (Z82 49)   · Denied: Family history of substance abuse  Father    · Denied: Family history of substance abuse  Maternal Grandmother    · Family history of Alzheimer's disease (V17 2) (Z82 0)    Social History    · Never A Smoker   · No alcohol use   · No drug use   · [employer]ism    Allergies    1  No Known Drug Allergies    Vitals   Recorded: 40LIW6258 64:73OW   Systolic 517   Diastolic 62   Heart Rate 74   Respiration 18   Temperature 98 F   LMP 29Wob8581   Height 5 ft 1 in   Weight 132 lb 5 oz   BMI Calculated 25   BSA Calculated 1 58     Physical Exam    Constitutional   General appearance: No acute distress, well appearing and well nourished  Eyes   Conjunctiva and lids: No swelling, erythema or discharge  mild erythema of both eyes  Pupils and irises: Equal, round and reactive to light  Ears, Nose, Mouth, and Throat   External inspection of ears and nose: Normal     Otoscopic examination: Tympanic membranes translucent with normal light reflex  Canals patent without erythema  Oropharynx: Normal with no erythema, edema, exudate or lesions  Pulmonary   Respiratory effort: No increased work of breathing or signs of respiratory distress  Auscultation of lungs: Clear to auscultation  Cardiovascular   Palpation of heart: Normal PMI, no thrills      Auscultation of heart: Normal rate and rhythm, normal S1 and S2, without murmurs  Examination of extremities for edema and/or varicosities: Normal     Abdomen   Abdomen: Non-tender, no masses  Liver and spleen: No hepatomegaly or splenomegaly  Lymphatic   Palpation of lymph nodes in neck: No lymphadenopathy  Musculoskeletal   Gait and station: Normal     Digits and nails: Normal without clubbing or cyanosis  Inspection/palpation of joints, bones, and muscles: Normal     Skin   Skin and subcutaneous tissue: Normal without rashes or lesions  Neurologic   Cranial nerves: Cranial nerves 2-12 intact  Reflexes: 2+ and symmetric  Sensation: No sensory loss  Psychiatric   Orientation to person, place, and time: Normal     Mood and affect: Normal        Signatures   Electronically signed by : JONO Avila;  Aug  5 2016  9:44AM EST                       (Author)    Electronically signed by : SIVA Walters ; Aug  5 2016  9:51AM EST                       (Author)

## 2018-01-11 NOTE — RESULT NOTES
Verified Results  (1) GLUCOSE TOLERANCE TEST, 3HR 65FOQ3718 12:55PM [facility] Staff Order Number: [id number]     Test Name Result Flag Reference   GLUCOSE TOLERANCE FASTING 86 mg/dL  65-99   GLUCOSE 1 HOUR 100 GM GLUC CHALLENGE-PREG  mg/dL     GLUCOSE 2 HOUR 100 GM GLUC CHALLENGE-PREG  mg/dL H

## 2018-01-11 NOTE — PROGRESS NOTES
MAR 20 2017         RE: Sandrane Dennis                               To: Reji   MR#: [id number]                                   [address] [place] [address]   : [address]  ENC: [id number] Kessler, [address]   (Exam #: [id number])                           Fax: [phone]      The LMP of this 39year old,  G4, P3-0-0-3 patient was unknown, her   working BONY is 2017 and the current gestational age is 42 weeks 6   days by 1st Trimester Sono  A sonographic examination was performed on MAR   20 2017 using real time equipment  The ultrasound examination was   performed using abdominal technique  The patient has a BMI of 26 4  Her   blood pressure today was 102/70  Earliest ultrasound found in her record: 16   12w6d   17  BONY      Problem list:   1  Advanced maternal age with NIPT that shows 36 7X XY or Klinefelter's   syndrome  She declined an amniocentesis   2  History of a prior baby with a VSD  A fetal echo in this pregnancy was   normal   3  GDMa1   4  Patient has painful varicose veins on her posterior thighs bilaterally     She had a recent duplex on 17 which was normal      Cardiac motion was observed at 129 bpm       INDICATIONS      previous child with cardiac defect   fetal growth   advanced maternal age   klinefelter syndrome      Exam Types      Level I      RESULTS      Fetus # 1 of 1   Vertex presentation   Fetal growth appeared normal   Placenta Location = Fundal   No placenta previa   Placenta Grade = I      MEASUREMENTS (* Included In Average GA)      AC              33 3 cm        37 weeks 3 days* (47%)   BPD              9 0 cm        36 weeks 1 day * (33%)   HC              32 1 cm        35 weeks 5 days* (13%)   Femur            6 3 cm        32 weeks 4 days* (<5%)      Humerus          5 4 cm        31 weeks 4 days  (<5%) Radius           4 9 cm        35 weeks 4 days   Ulna             5 5 cm        34 weeks 6 days   Tibia            5 3 cm        31 weeks 2 days  (<5%)   Fibula           5 4 cm        31 weeks 4 days      Cerebellum       5 0 cm        36 weeks 4 days   CisternaMagna    7 3 mm      HC/AC           0 96   FL/AC           0 19   FL/BPD          0 71   EFW (Ac/Fl/Hc)  2766 grams - 6 lbs 2 oz                 (15%)      THE AVERAGE GESTATIONAL AGE is 35 weeks 4 days +/- 21 days  AMNIOTIC FLUID      Q1: 5 7      Q2: 1 5      Q3:          Q4: 4 3   TYLER Total = 11 5 cm   Amniotic Fluid: Normal      ANATOMY DETAILS      Visualized Appearing Sonographically Normal:   HEAD: (Calvarium, BPD Level, Cavum, Lateral Ventricles, Cerebellum,   Cisterna Magna);    TH  CAV : (Diaphragm); HEART: (Proximal Left   Outflow, Proximal Right Outflow, Cardiac Axis, Cardiac Position);      STOMACH, RIGHT KIDNEY, LEFT KIDNEY, BLADDER, PLACENTA      Not Visualized:   HEART: (Four Chamber View)      IMPRESSION      Bryan IUP   35 weeks and 4 days by this ultrasound  (BONY=APR 20 2017)   37 weeks and 6 days by 1st Tri Sono  (BONY=APR 4 2017)   Vertex presentation   Fetal growth appeared normal   Regular fetal heart rate of 129 bpm   Fundal placenta   No placenta previa      GENERAL COMMENT      On exam today the patient appears well, in no acute distress, and denies   any complaints other than recent contractions  Her abdomen is non-tender  There has been appropriate interval fetal growth with mild mesomelia   probably related to a constitutionally small fetus and unrelated to the   suspected sex chromosomal abnormality as those males with Klinefelter   syndrome tend to be tall and phenotypically normal at birth  Good fetal   movement and tone are seen    The amniotic fluid volume appears normal     The placenta is fundal and it appears sonographically normal   The   anatomic structures listed above could not be optimally imaged today   because of fetal position; however, these structures were seen on a prior   ultrasound and appeared sonographically normal   The patient was informed   of today's findings and all of her questions were answered  The   limitations of ultrasound were reviewed with the patient  Labor   precautions and fetal kick counts were reviewed  We talked about the patient's current state of her diabetes  She   continues to maintain close contact with our diabetes in pregnancy program   and she continues to do a very good job at controlling her blood sugars   with diet alone  The patient is an A1 gestational diabetic     surveillance is not required for this group of patients as long as there   is appropriate fetal growth and normal glucose values  The patient was   informed of today's findings and all of her questions were answered  Recommend that the patient be delivered by 41  weeks gestation; however,   if delivery is delayed beyond 40 weeks gestation,  testing should   be initiated at that time  Thank you very much for allowing us to participate in the care of this   very nice patient  Should you have any questions, please do not hesitate   to contact our office  Please note, in addition to the time spent discussing the results of the   ultrasound, I spent approximately 10 minutes of face-to-face time with the   patient, greater than 50% of which was spent in counseling and the   coordination of care for this patient  RENU Quintana M D     Electronically signed 17 10:23

## 2018-01-11 NOTE — PROGRESS NOTES
NOV 15 2016         RE: Dorian Brown                               To: [facility]   MR#: [id number]                                   [address] [place] [address]   :  [facility]  ENC: [id number]                             [place], [address]   (Exam #: [id number])                           Fax: [phone]      The LMP of this 39year old,  G4, P3-0-0-3 patient was unknown, her   working BONY is 2017 and the current gestational age is 25 weeks 0   days by  [address] [place] [address]  A sonographic examination was performed on NOV   15 2016 using real time equipment  The ultrasound examination was   performed using abdominal & vaginal techniques  The patient has a BMI of   25 3  Her blood pressure today was 113/71  Earliest ultrasound found in her record: 16   12w6d   17  BONY      Donovan Howard has no complaints today  She reports fetal movement and denies   vaginal bleeding  Non invasive prenatal testing recently performed using   cell free DNA analysis revealed a 52, XXY karyotype, consistent with   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  Donovan Howard had subsequent genetic counseling   with Marylene Elm and has declined confirmation of this karyotype with   definitive prenatal diagnosis by genetic amniocentesis        Cardiac motion was observed at 144 bpm       INDICATIONS      advanced maternal age   previous child with cardiac defect      Exam Types      Transvaginal   LEVEL II      RESULTS      Fetus # 1 of 1   Vertex presentation   Fetal growth appeared normal   Placenta Location = Posterior, fundal   No placenta previa   Placenta Grade = I      MEASUREMENTS (* Included In Average GA)      AC              13 6 cm        18 weeks 5 days* (26%)   BPD              4 5 cm        19 weeks 4 days* (39%)   HC              16 7 cm        19 weeks 2 days* (28%)   Femur            2 9 cm        19 weeks 1 day * (23%)      Nuchal Fold      3 6 mm      Humerus          2 9 cm        19 weeks 4 days  (42%)      Cerebellum       2 0 cm        19 weeks 4 days   Biorbit          3 1 cm        19 weeks 5 days   CisternaMagna    4 5 mm      HC/AC           1 23   FL/AC           0 22   FL/BPD          0 66   EFW (Ac/Fl/Hc)   271 grams - 0 lbs 10 oz      THE AVERAGE GESTATIONAL AGE is 19 weeks 1 day +/- 10 days  AMNIOTIC FLUID         Largest Vertical Pocket = 4 1 cm   Amniotic Fluid: Normal      CERVICAL EVALUATION      The cervix appeared normal (Ultrasound Examination)  SUPINE      Cervical Length: 4 50 cm      OTHER TEST RESULTS           Funneling?: No             Dynamic Changes?: No        Resp  To TFP?: No                      Debris?: No      ANATOMY      Head                                    Normal   Face/Neck                               Normal   Th  Cav  Normal   Heart                                   See Details   Abd  Cav  Normal   Stomach                                 Normal   Right Kidney                            Normal   Left Kidney                             Normal   Bladder                                 Normal   Abd  Wall                               Normal   Spine                                   Normal   Extrems                                 Normal   Genitalia                               Normal   Placenta                                Normal   Umbl  Cord                              Normal   Uterus                                  Normal   PCI                                     Normal      ANATOMY DETAILS      Visualized Appearing Sonographically Normal:   HEAD: (Calvarium, BPD Level, Cavum, Lateral Ventricles, Choroid Plexus,   Cerebellum, Cisterna Magna);    FACE/NECK: (Neck, Nuchal Fold, Profile,   Orbits, Nose/Lips, Palate, Face);    TH  CAV  : (Lungs, Diaphragm);       HEART: (Four Chamber View, Proximal Left Outflow, Proximal Right Outflow,   3VV, 3 Vessel Trachea, Short Axis of Greater Vessels, Ductal Arch, IVC,   SVC, Cardiac Axis, Cardiac Position);    ABD  CAV : (Liver);    STOMACH,   RIGHT KIDNEY, LEFT KIDNEY, BLADDER, ABD  WALL, SPINE: (Cervical Spine,   Thoracic Spine, Lumbar Spine, Sacrum);    EXTREMS: (Lt Humerus, Rt   Humerus, Lt Forearm, Rt Forearm, Lt Hand, Rt Hand, Lt Femur, Rt Femur, Lt   Low Leg, Rt Low Leg, Lt Foot, Rt Foot);    GENITALIA (Male), PLACENTA,   UMBL  CORD, UTERUS, PCI      Not Visualized:   HEART: (Aortic Arch, Interventricular Septum, Interatrial Septum)      ADNEXA      The left ovary appeared normal and measured 2 0 x 1 4 x 1 3 cm with a   volume of 1 9 cc  The right ovary appeared normal and measured 2 6 x 1 7 x   1 0 cm with a volume of 2 3 cc  IMPRESSION      Bryan IUP   19 weeks and 1 day by this ultrasound  (BONY=APR 10 2017)   20 weeks and 0 days by 1st Tri Sono  (BONY=APR 4 2017)   Vertex presentation   Fetal growth appeared normal   Regular fetal heart rate of 144 bpm   Posterior, fundal placenta   No placenta previa      GENERAL COMMENT      No fetal structural abnormality or ultrasound marker for aneuploidy is   identified on the Level II ultrasound study today  Suboptimal imaging of   the aortic arch and cardiac septal views is afforded by unfavorable fetal   position  Fetal growth and amniotic fluid volume are normal   The placenta   is normal in appearance  The cervix is normal in appearance by transvaginal sonography  The   cervical length is normal   Cervical debris is not present  Cervical   funneling is not present  Neither provocative nor dynamic change is   appreciated  Today's ultrasound findings and suggested follow-up were discussed in   detail with Mame  We discussed that prenatal ultrasound cannot rule   out all congenital abnormalities   She will [facility] the [facility]    on December 7 for fetal echocardiography given her history of a prior baby   with a congenital heart defect  Assessment of fetal interval growth and   anatomy will be performed at 28 weeks gestation  She is aware that her   NIPT results are not diagnostic  The positive predictive value for   Klinefelter syndrome with this test is 37%, with a likelihood that the   result is a false positive of 63%  Kathy Handy prefers that  testing   of her baby be performed for confirmatory testing  The face to face time, in addition to time spent discussing ultrasound   results, was approximately 10 minutes, greater than 50% of which was spent   during counseling and coordination of care  RENU High M D     Maternal-Fetal Medicine   Electronically signed 11/15/16 18:27

## 2018-01-12 ENCOUNTER — GENERIC CONVERSION - ENCOUNTER (OUTPATIENT)
Dept: OTHER | Facility: OTHER | Age: 38
End: 2018-01-12

## 2018-01-12 VITALS
WEIGHT: 143 LBS | BODY MASS INDEX: 27 KG/M2 | HEIGHT: 61 IN | DIASTOLIC BLOOD PRESSURE: 72 MMHG | SYSTOLIC BLOOD PRESSURE: 108 MMHG

## 2018-01-12 VITALS
BODY MASS INDEX: 26.43 KG/M2 | HEIGHT: 61 IN | WEIGHT: 140 LBS | SYSTOLIC BLOOD PRESSURE: 102 MMHG | DIASTOLIC BLOOD PRESSURE: 70 MMHG

## 2018-01-12 VITALS
HEIGHT: 61 IN | SYSTOLIC BLOOD PRESSURE: 110 MMHG | DIASTOLIC BLOOD PRESSURE: 71 MMHG | BODY MASS INDEX: 27.19 KG/M2 | WEIGHT: 144 LBS

## 2018-01-12 DIAGNOSIS — B35.3 TINEA PEDIS: ICD-10-CM

## 2018-01-12 NOTE — PROGRESS NOTES
Chief Complaint  Patient and , Pinky Fernandez, seen for genetic counseling to discuss her cell free fetal DNA test results that were positive for 52, XXY or Klinefelter Syndrome  The patient declined having an  through the telephone instead choosing for her  to interpret  Counseling was somewhat difficult given the language barrier though her  appeared fairly fluent in [place]  We discussed the fact that the cell free fetal DNA testing or NIPS is a screening test only  The positive predictive value (PPV) for Klinefelter syndrome with this testing given the patient's age using a NIPS PPV calculator endorsed by the [address] [place] [address] [facility] is 37% with a false positive rate of 63%  Using the sensitivity(96 2%) and specifity(99 7%) for 52, XXY as provided by a genetic counselor from 's labs the [address] calculates even lower at 22%  We discussed that prenatal diagnosis is the only way to confirm the diagnosis in the pregnancy  We reviewed the risks and benefits of amniocentesis as previously discussed with the patient at her prior genetic counseling appointment  This couple elected to decline amniocentesis at this time  They are interested in having the baby tested after birth and we discussed the possibility of chromosome analysis being performed on a sample of blood from the umbilical cord at the time of delivery  If this is not able to take place then they were advised to discuss confirmatory testing on the baby with their pediatrician  I provided them with an information sheet in [place] from a hormone health network which has a nice chart on the signs and symptoms of this condition by age group as well as a list of resources  A copy of the fact sheet and English is scanned into the patient's record  Patient and her  indicated that all of their questions were answered at this time   They do have a level II ultrasound evaluation scheduled for the appropriate time  They were encouraged to contact me with any additional questions or if they change their mind regarding prenatal diagnosis  Active Problems    1  Allergic conjunctivitis of both eyes (372 14) (H10 13)   2  Amenorrhea (626 0) (N91 2)   3  Asymptomatic bacteriuria (791 9) (R82 71)   4  Cervical cancer screening (V76 2) (Z12 4)   5  's permit PE (physical examination) (V70 3) (Z02 4)   6  Dysuria (788 1) (R30 0)   7  Elderly primigravida in second trimester (659 53) (O09 512)   8  Encounter for supervision of normal pregnancy in multigravida in first trimester (V22 1)   (Z34 81)   9  Episode of dizziness (780 4) (R42)   10  Nausea and vomiting in pregnancy (643 90) (O21 9)   11  Previous child born with ventricular septal defect, currently pregnant (655 23) (O35 2XX0)   15  Screening for STD (sexually transmitted disease) (V74 5) (Z11 3)    Surgical History    1  History of Appendectomy    Family History    1  Family history of hypertension (V17 49) (Z82 49)   2  Denied: Family history of substance abuse    3  Denied: Family history of substance abuse    4  Family history of Alzheimer's disease (V17 2) (Z82 0)    Social History    · Never A Smoker   · No alcohol use   · No drug use   · [employer]    Current Meds   1  Metoclopramide HCl - 10 MG Oral Tablet; TAKE 1 TABLET EVERY 6 HOURS AS NEEDED; Therapy: 89Zxi2825 to (Luevenia Sole)  Requested for: 51Ojy3880; Last   Rx:73Eij6524 Ordered   2  Prenatal Vitamins 0 8 MG Oral Tablet; TAKE 1 TABLET DAILY; Therapy: 02PAL1974 to (Evaluate:44Jns0745)  Requested for: 06Fwf4323; Last   Rx:[id number]; Status: ACTIVE - Retrospective By Protocol Authorization Ordered   3  Unisom 25 MG Oral Tablet; TAKE 1 TABLET AT BEDTIME; Therapy: 49XSC8926 to (Yeison Zamorano)  Requested for: 58SST9254; Last   Rx:52Eij8801 Ordered   4  Vitamin B-6 25 MG Oral Tablet; TAKE 1 TABLET BY MOUTH  3 TIMES DAILY;    Therapy: 04LMV7116 to (Last Rx:51Bud1872) Requested for: 23FMY8675 Ordered    Allergies    1  No Known Drug Allergies    2  No Known Environmental Allergies   3  No Known Food Allergies   4   Other    Vitals  Signs   Recorded: 34PBV1035 59:54LH   Systolic: 862, LUE, Sitting  Diastolic: 63, LUE, Sitting  Pain Scale: 0  Height: 5 ft 1 in  Weight: 126 lb   BMI Calculated: 23 81  BSA Calculated: 1 55    Future Appointments    Date/Time Provider Specialty Site   [facility]   [facility]   [facility]     Signatures   Electronically signed by : Regulo Singleton, ; Oct 21 2016 12:58PM EST                       (Author)

## 2018-01-12 NOTE — PROGRESS NOTES
DEC 7 2016         RE: Stephenie Fofana                               To: Francia Galindo   MR#: [id number]                                   [address] [place] [address]   :  [address]  ENC: [id number]                             [place], [address]   (Exam #: [id number])                           Fax: [phone]      The LMP of this 39year old,  G4, P3-0-0-3 patient was unknown, her   working BONY is 2017 and the current gestational age is 20 weeks 1   day by [address] [place] [address]  A sonographic examination was performed on DEC   7 2016 using real time equipment  The ultrasound examination was performed   using abdominal technique  The patient has a BMI of 26 3  Her blood   pressure today was 114/72        Earliest ultrasound found in her record: 16   12w6d   17  BONY      Cardiac motion was observed at 148 bpm       INDICATIONS      previous child with cardiac defect      Exam Types      Fetal Echocardiogram      FETAL VESSELS                                     S/D   PI    RI    PSV   AEDV RF                                                    cm/s       Umbilical Artery                 0 90              No   No       Middle Cerebral Artery           2 22      FETAL VESSELS                                    PVSys PVDia PASys  S/D   S/A   DVI   RF       Ductus Venosus:                                                No      ANATOMY DETAILS      Visualized Appearing Sonographically Normal:   HEART: (Four Chamber View, Proximal Left Outflow, Proximal Right Outflow,   3VV, 3 Vessel Trachea, Short Axis of Greater Vessels, Ductal Arch, Aortic   Arch, Interventricular Septum, Interatrial Septum, IVC, SVC, Cardiac Axis,   Cardiac Position)      IMPRESSION      Bryan IUP   23 weeks and 1 day by [address] [place] [address] Sono  (BONY=2017)   Regular fetal heart rate of 148 bpm   Normal Fetal Echocardiography GENERAL COMMENT      Fetal echocardiography was performed for the indication of a prior baby   with a congenital heart defect  The heart is in normal anatomic position   within the left chest   All four chambers were identified with a normal,   45-degree leftward axis  There is no suspicion of an echogenic   intracardiac focus  Tricuspid regurgitation is not present  Right and   left ventricular and atrial sizes were concordant  The ventricular and   atrial septi appear intact by 2D echo and color Doppler  The aorta arises   in normal anatomic relationship from the left ventricle  The pulmonary   artery arises in normal anatomic relationship from the right ventricle  The short axis and three vessel views appear normal   The ductus arterosis   joins the aorta in a normal anatomic relationship  The aortic arch,   pulmonary veins, inferior and superior vena cava, and the right and left   ventricular outflow tracts were identified and appear normal  The flow   velocities across the mitral, tricuspid, aortic, pulmonary valves, aortic   arch and ductus arteriosus, appear normal  There is no evidence of an   anatomical defect present within the heart  The fetal heart rate was   regular throughout the exam period  There is no suspicion of a fetal   dysrhythmia  The umbilical and middle cerebral artery, and ductus   venosus, Doppler studies are normal       Today's ultrasound findings were discussed in detail with Mame  She   was advised of the findings and counseled about the limitations of fetal   echocardiography in detecting all forms of congenital anomalies  For   example, fetal echocardiography may not detect small septal defects and   minor valvular abnormalities  Other examples of difficult    diagnosis include post valvular stenosis, coarctation of the aorta, and   anomalous pulmonary venous return  RENU Zee M D     Maternal-Fetal Medicine Electronically signed 12/07/16 11:40

## 2018-01-13 VITALS — SYSTOLIC BLOOD PRESSURE: 112 MMHG | DIASTOLIC BLOOD PRESSURE: 70 MMHG | BODY MASS INDEX: 26.07 KG/M2 | WEIGHT: 138 LBS

## 2018-01-13 NOTE — MISCELLANEOUS
Reason For Visit  Reason For Visit Free Text Note Form: Received request to contact pt  regarding elevated depression score  Call to pt  and left message  Letter mailed to pt  today including contact information for  at [facility] as well as contact information for [facility]  Crisis 24 hour hotline number included as well  Letter scanned to EMR  Will continue to be available to provide support  Active Problems    1  Allergic conjunctivitis of both eyes (372 14) (H10 13)   2  Amenorrhea (626 0) (N91 2)   3  Asymptomatic bacteriuria (791 9) (N39 0)   4  Cervical cancer screening (V76 2) (Z12 4)   5  's permit PE (physical examination) (V70 3) (Z02 4)   6  Dysuria (788 1) (R30 0)   7  Encounter for supervision of normal pregnancy in multigravida in first trimester (V22 1)   (Z34 81)   8  Episode of dizziness (780 4) (R42)   9  Nausea and vomiting in pregnancy (643 90) (O21 9)    Current Meds   1  Metoclopramide HCl - 10 MG Oral Tablet; TAKE 1 TABLET EVERY 6 HOURS AS NEEDED; Therapy: 67Maj7760 to (Herlinda De Leon)  Requested for: 40Qlg8778; Last   Rx:67Oph2417 Ordered   2  Prenatal Vitamins 0 8 MG Oral Tablet; TAKE 1 TABLET DAILY; Therapy: 62RVP0818 to (Evaluate:01Tpt1990)  Requested for: 27Nfy7398; Last   Rx:[id number]; Status: ACTIVE - Retrospective By Protocol Authorization Ordered    Allergies    1   No Known Drug Allergies    Signatures   Electronically signed by : ELVIS Renteria; Sep 22 2016  3:11PM EST                       (Author)

## 2018-01-14 VITALS — BODY MASS INDEX: 22.86 KG/M2 | SYSTOLIC BLOOD PRESSURE: 113 MMHG | DIASTOLIC BLOOD PRESSURE: 71 MMHG | WEIGHT: 121 LBS

## 2018-01-14 VITALS
DIASTOLIC BLOOD PRESSURE: 62 MMHG | WEIGHT: 121.13 LBS | HEIGHT: 61 IN | HEART RATE: 78 BPM | OXYGEN SATURATION: 99 % | TEMPERATURE: 98.2 F | BODY MASS INDEX: 22.87 KG/M2 | SYSTOLIC BLOOD PRESSURE: 100 MMHG | RESPIRATION RATE: 16 BRPM

## 2018-01-14 VITALS
SYSTOLIC BLOOD PRESSURE: 110 MMHG | RESPIRATION RATE: 18 BRPM | HEART RATE: 84 BPM | OXYGEN SATURATION: 98 % | DIASTOLIC BLOOD PRESSURE: 62 MMHG | WEIGHT: 124 LBS | BODY MASS INDEX: 23.41 KG/M2 | TEMPERATURE: 98 F | HEIGHT: 61 IN

## 2018-01-15 NOTE — PROGRESS NOTES
OCT 5 2016         RE: Lindsey Do                               To: EDWARDO RIVERA [facility]   MR#: [id number]                                   [address] [place] [address]   : [address] [place] [address]  ENC: [id number]                             [address]   (Exam #: [id number])                           Fax: [phone]      The LMP of this 39year old,  G4, P3-0-0-3 patient was unknown, her   working BONY is 2017 and the current gestational age is 12 weeks 1   day by [address] [place] [address] [address]  A sonographic examination was performed on OCT   5 2016 using real time equipment  The ultrasound examination was performed   using abdominal technique  The patient has a BMI of 23 4  Her blood   pressure today was 95/69  Earliest ultrasound found in her record: 16   12w6d   17  BONY   Multiple longitudinal and transverse sections revealed a spear   intrauterine pregnancy with the fetus in variable presentation  The   placenta is posterior in implantation  Cardiac motion was observed at 138 bpm       INDICATIONS      advanced maternal age      Exam Types      Level I      RESULTS      Fetus # 1 of 1   Variable presentation   Fetal growth appeared normal      MEASUREMENTS (* Included In Average GA)      CRL              7 7 cm        13 weeks 4 days*   Nuchal Trans    2 20 mm      THE AVERAGE GESTATIONAL AGE is 13 weeks 4 days +/- 7 days  UTERINE ARTERIES                                  S/D   PI    RI    NOTCH       Left Uterine Artery              1 74         Yes       Right Uterine Artery             1 46         No      ANATOMY COMMENTS      Anatomic detail is limited at this gestational age  The yolk sac was not   noted  The fetal cranium appeared normal in shape and the nuchal   translucency was normal in size at 2 2 mm  The nasal bone appears to be   present    The intracranial anatomy was unremarkable  Evaluation of the   spine revealed no obvious evidence for a neural tube defect  Anatomy of   the fetal thorax appeared within normal limits  The cardiac rhythm was   regular  Within the abdomen, stomach & bladder were visualized and the   abdominal wall appeared intact  A three vessel cord appears to be present  Active movement of the fetal body & extremities was seen  There is no   suspicion of a subchorionic bleed  The placental cord insertion was   normal    The uterine artery Dopplers are normal for this gestational age  There is no suspicion of a uterine myoma  Free fluid is not seen in the   posterior cul-de-sac  ADNEXA      The left ovary was not visualized  The right ovary was not visualized  AMNIOTIC FLUID         Largest Vertical Pocket = 2 7 cm   Amniotic Fluid: Normal      IMPRESSION      Bryan IUP   13 weeks and 4 days by this ultrasound  (BONY=2017)   14 weeks and 1 day by 1st Tri Sono  (BONY=2017)   Variable presentation   Fetal growth appeared normal   Regular fetal heart rate of 138 bpm   Posterior placenta      CONSULT COMMENT      Thank you very much for your kind referral of Dorian Brown to the   [address] [place] [address] in [place] on 2016 for fetal ultrasound   evaluation and  consult  Donovan Howard is a 16-year-old    female  4 para 3003 who is currently at 14-1/7 weeks gestation by   an estimated due date of 2017  She is referred for the indication   of advanced maternal age  Mame met with Marylene Elm for genetic   counseling earlier this morning for this indication additionally  With the   exception of nausea, she has no complaints today  She denies vaginal   bleeding  She has not recently received the influenza vaccine  Donovan Howard has a history 3 prior vaginal deliveries at term  between 2006 and    following apparently uncomplicated prenatal courses  She delivered   appropriately grown babies  Her second child was diagnosed as a baby with   a VSD which requires pediatric cardiology follow-up, but which has not   required surgery  Each of her children is currently otherwise healthy  Ana More has unremarkable past medical and surgical histories, with the   exception of an appendectomy  She currently takes no medication with the   exception of a prenatal vitamin on a daily basis  She has no known drug   allergy, though she does have a latex allergy  Ana More denies tobacco,   alcohol, or illicit drug use during the pregnancy  Today's ultrasound findings and suggested follow-up were discussed in   detail with Mame  Given her age of 39 years at her estimated due date,   her risk to have a live born baby with Down syndrome is one in 5, with a   risk for a live born baby with any chromosomal abnormality of one in 80  We discussed that definitive prenatal diagnosis is possible only through   genetic amniocentesis or chorionic villus sampling, which she declines  We   discussed the availability of non invasive prenatal testing using cell   free DNA analysis, which has a 99% Mame opted for NIPT which was drawn   today  Results will be available in about 10 days  Follow-up MSAFP testing   is recommended between 16 and 20 weeks gestation  She will return to the   [facility]  at 20 weeks gestation for level II ultrasound evaluation  Fetal echocardiography will be performed at about 24 weeks gestation given   her history of a prior baby with a VSD  The face to face time, in addition to time spent discussing ultrasound   results, was approximately 15 minutes, greater than 50% of which was spent   during counseling and coordination of care  RENU Mark , SIVA Cueto     Maternal-Fetal Medicine   Electronically signed 10/05/16 18:55

## 2018-01-15 NOTE — PROGRESS NOTES
2017         RE: Carlos Kidd                               To: Francia Galindo   MR#: [id number]                                   [address] [place] [address]   :  [facility], [address]   (Exam #: [id number])                           Fax: [phone]      The LMP of this 39year old,  G4, P3-0-0-3 patient was unknown, her   working BONY is 2017 and the current gestational age is 32 weeks 6   days by  [facility] [address]  A sonographic examination was performed on 2017 using real time equipment  The ultrasound examination was performed   using abdominal technique  The patient has a BMI of 27 2  Her blood   pressure today was 110/77  Earliest ultrasound found in her record: 16   12w6d   17  BONY      Cardiac motion was observed at 135 bpm       INDICATIONS      previous child with cardiac defect   fetal growth   advanced maternal age      Exam Types      Level I      RESULTS      Fetus # 1 of 1   Vertex presentation   Fetal growth appeared normal   Placenta Location = Fundal   Placenta Grade = II      MEASUREMENTS (* Included In Average GA)      AC              23 4 cm        27 weeks 4 days* (41%)   BPD              6 6 cm        26 weeks 5 days* (17%)   HC              25 3 cm        27 weeks 1 day * (22%)   Femur            4 8 cm        26 weeks 2 days* (9%)      Cerebellum       3 0 cm        27 weeks 2 days      HC/AC           1 08   FL/AC           0 21   FL/BPD          0 73   EFW (Ac/Fl/Hc)  1032 grams - 2 lbs 4 oz                 (36%)      THE AVERAGE GESTATIONAL AGE is 27 weeks 0 days +/- 14 days        AMNIOTIC FLUID      Q1: 3 9      Q2: 3 3      Q3: 4 6      Q4: 5 9   TYLER Total = 17 7 cm   Amniotic Fluid: Normal      ANATOMY DETAILS      Visualized Appearing Sonographically Normal:   HEAD: (Calvarium, BPD Level, Cavum, Lateral Ventricles, Cerebellum); HEART: (Four Chamber View, Proximal Left Outflow, Proximal Right Outflow,   Cardiac Axis, Cardiac Position);    STOMACH, RIGHT KIDNEY, LEFT KIDNEY,   BLADDER, GENITALIA (Male), PLACENTA      IMPRESSION      Bryan IUP   27 weeks and 0 days by this ultrasound  (BONY=APR 10 2017)   27 weeks and 6 days by 1st Tri Sono  (BONY=2017)   Vertex presentation   Fetal growth appeared normal   Regular fetal heart rate of 135 bpm   Fundal placenta      GENERAL COMMENT      On exam today the patient appears well, in no acute distress, and denies   any complaints  Her abdomen is non-tender  There has been appropriate interval fetal growth  Good fetal movement and   tone are seen  The amniotic fluid volume appears normal   The placenta is   fundal and it appears sonographically normal    The patient was informed   of today's findings and all of her questions were answered  The   limitations of ultrasound were reviewed with the patient   labor   precautions and fetal kick counts were reviewed  We discussed appropriate follow-up in detail and I recommend Gato Hams   return in 6 weeks for a fetal growth evaluation  Thank you very much for allowing us to participate in the care of this   very nice patient  Should you have any questions, please do not hesitate   to contact our office  Please note, in addition to the time spent discussing the results of the   ultrasound, I spent approximately 10 minutes of face-to-face time with the   patient, greater than 50% of which was spent in counseling and the   coordination of care for this patient  RENU Santos M D     Electronically signed 17 09:49

## 2018-01-16 NOTE — PROGRESS NOTES
2017         RE: Anil Bernabe                               To: [address]   MR#: [id number]                                   [address] [place] [address]   :  [facility]  [facility]    (Exam #: [id number])                           Fax: [phone]      The LMP of this 39year old,  G4, P3-0-0-3 patient was unknown, her   working BONY is 2017 and the current gestational age is 29 weeks 6   days by 1st Trimester Sono  A sonographic examination was performed on 2017 using real time equipment  The ultrasound examination was   performed using abdominal technique  The patient has a BMI of 27 0  Her   blood pressure today was 108/72  Earliest ultrasound found in her record: 16   12w6d   17  BONY      Problem list:   1  Advanced maternal age with NIPT that shows 36 7X XY or Klinefelter's   syndrome  She declined an amniocentesis   2  History of a prior baby with a VSD  A fetal echo in this pregnancy was   normal   3  GDMa1   4  Patient has painful varicose veins on her posterior thighs bilaterally     She had a recent duplex on 17 which was normal      Cardiac motion was observed at 142 bpm       INDICATIONS      previous child with cardiac defect   fetal growth   advanced maternal age   klinefelter syndrome      Exam Types      Level I      RESULTS      Fetus # 1 of 1   Breech presentation   Placenta Location = Posterior, fundal   No placenta previa   Placenta Grade = I      MEASUREMENTS (* Included In Average GA)      AC              28 2 cm        32 weeks 2 days* (22%)   BPD              8 1 cm        32 weeks 4 days* (22%)   HC              31 1 cm        34 weeks 3 days* (43%)   Femur            6 3 cm        32 weeks 3 days* (29%)      Cerebellum       4 1 cm        34 weeks 1 day      HC/AC           1 10   FL/AC 0 22   FL/BPD          0 78   EFW (Ac/Fl/Hc)  2046 grams - 4 lbs 8 oz                 (27%)      THE AVERAGE GESTATIONAL AGE is 32 weeks 6 days +/- 18 days  AMNIOTIC FLUID      Q1: 3 5      Q2: 2 7      Q3: 3 4      Q4: 5 2   TYLER Total = 14 8 cm   Amniotic Fluid: Normal      ANATOMY DETAILS      Visualized Appearing Sonographically Normal:   HEAD: (Calvarium, BPD Level, Cavum, Lateral Ventricles, Cerebellum); HEART: (Four Chamber View, Proximal Left Outflow, Proximal Right Outflow,   Cardiac Axis, Cardiac Position);    STOMACH, RIGHT KIDNEY, LEFT KIDNEY,   BLADDER, GENITALIA (Male), PLACENTA      ANATOMY COMMENTS      Fetal anatomy has been previously documented; no anomalies were   identified  No fetal structural abnormality is identified on the Level I   survey today  Fetal interval growth and amniotic fluid volume are normal       BIOPHYSICAL PROFILE      The Biophysical Profile score was 8/8  Breathin  Movement: 2  Tone: 2  AFV: 2      IMPRESSION      Bryan IUP   32 weeks and 6 days by this ultrasound  (BONY=2017)   33 weeks and 6 days by 1st Tri Sono  (BONY=2017)   Breech presentation   Regular fetal heart rate of 142 bpm   Posterior, fundal placenta   No placenta previa      GENERAL COMMENT      The patient was informed of the findings and counseled about the   limitations of the exam in detecting all forms of fetal congenital   abnormalities  The language line  #[id number] for was used to  this patient  She denies any vaginal bleeding or uterine cramping/contractions  She does   feel fetal movement  She is managing her blood sugars well on diet alone  This week fasting blood sugars are 87 or less and 2 hour postprandials are   mostly less than 120 except one that was at 150  She does complain today   about bilateral posterior thigh discomfort from her varicose veins   She   had a recent duplex on 17 which was normal       Exam shows she is comfortable and her abdomen is non tender  I examined   her posterior thighs and calves and she does have mildly prominent   varicose veins but I find no evidence of a cord or erythema or unusual   discomfort in this area  I do not notice any significant edema in either   leg  Follow up recommended:   1  Recommend one last growth scan in 4 weeks secondary to A1GDM  I   reviewed again with her that NIPT is a screen  Hence testing postnatally   needs to be done to confirm the fetal diagnosis of Klinefelter's  2  I encouraged a program of exercise to help with her varicose veins  She   was encouraged to be active and not do a lot of standing or sitting which   will encourage further pooling of venous blood in this area  The face to face time, in addition to time spent discussing ultrasound   results, was approximately 15 minutes, greater than 50% of which was spent   during counseling and coordination of care  RENU Branham M D     Maternal-Fetal Medicine   Electronically signed 02/21/17 00:55

## 2018-01-17 NOTE — MISCELLANEOUS
Message  Spontaneous vaginal delivery on 4/10/2017  -No pain  -breast feeding  -3 weeks postpartum appointment given  -Patient had no complaints      Active Problems    1  Aneuploidy in fetus affecting management of mother (655 10) (O35 1XX0)   2  Asymptomatic bacteriuria (791 9) (R82 71)   3  Breastfeeding (infant) (V49 89) (Z78 9)   4  Diet controlled gestational diabetes mellitus (GDM) in third trimester (648 83) (O24 410)   5  Elderly primigravida in second trimester (659 53) (O09 512)   6  Encounter for pregnancy related examination in third trimester (V22 1) (Z34 83)   7  Normal pregnancy (V22 2) (Z34 90)   8  Pain of left lower leg (729 5) (M79 662)   9  Pain of lower extremity, unspecified laterality (729 5) (M79 606)   10  Previous child born with ventricular septal defect, currently pregnant (655 23) (O35 2XX0)   6  Varicosities of leg (454 9) (I83 90)    Current Meds   1  Breast Pump Miscellaneous; USE AS DIRECTED; Therapy: 26FID2772 to (Last Rx:13Mar2017) Ordered   2  Prenatal Vitamins 0 8 MG Oral Tablet; TAKE 1 TABLET DAILY; Therapy: 28CDF5920 to (Evaluate:82Pwm6066)  Requested for: 99Sts6906; Last   Rx:58Kmb4362; Status: ACTIVE - Retrospective By Protocol Authorization Ordered    Allergies    1  No Known Drug Allergies    2  Latex   3   Other    Signatures   Electronically signed by : Elmer Wilkerson, ; Apr 14 2017 10:02AM EST                       (Author)

## 2018-01-22 VITALS — SYSTOLIC BLOOD PRESSURE: 99 MMHG | BODY MASS INDEX: 26.45 KG/M2 | WEIGHT: 140 LBS | DIASTOLIC BLOOD PRESSURE: 61 MMHG

## 2018-01-22 VITALS — DIASTOLIC BLOOD PRESSURE: 75 MMHG | WEIGHT: 123 LBS | SYSTOLIC BLOOD PRESSURE: 109 MMHG | BODY MASS INDEX: 23.24 KG/M2

## 2018-01-22 VITALS — WEIGHT: 137 LBS | BODY MASS INDEX: 25.89 KG/M2 | DIASTOLIC BLOOD PRESSURE: 76 MMHG | SYSTOLIC BLOOD PRESSURE: 122 MMHG

## 2018-01-22 VITALS — DIASTOLIC BLOOD PRESSURE: 72 MMHG | WEIGHT: 137 LBS | SYSTOLIC BLOOD PRESSURE: 115 MMHG | BODY MASS INDEX: 25.89 KG/M2

## 2018-01-22 VITALS — WEIGHT: 143 LBS | DIASTOLIC BLOOD PRESSURE: 66 MMHG | BODY MASS INDEX: 27.02 KG/M2 | SYSTOLIC BLOOD PRESSURE: 109 MMHG

## 2018-01-22 VITALS — DIASTOLIC BLOOD PRESSURE: 66 MMHG | SYSTOLIC BLOOD PRESSURE: 99 MMHG | BODY MASS INDEX: 27.02 KG/M2 | WEIGHT: 143 LBS

## 2018-01-22 VITALS
SYSTOLIC BLOOD PRESSURE: 107 MMHG | BODY MASS INDEX: 22.66 KG/M2 | DIASTOLIC BLOOD PRESSURE: 69 MMHG | WEIGHT: 120 LBS | HEIGHT: 61 IN

## 2018-01-22 VITALS — WEIGHT: 137 LBS | BODY MASS INDEX: 25.89 KG/M2 | DIASTOLIC BLOOD PRESSURE: 59 MMHG | SYSTOLIC BLOOD PRESSURE: 99 MMHG

## 2018-01-22 VITALS — WEIGHT: 143 LBS | BODY MASS INDEX: 27.02 KG/M2 | SYSTOLIC BLOOD PRESSURE: 115 MMHG | DIASTOLIC BLOOD PRESSURE: 72 MMHG

## 2018-01-24 VITALS
HEIGHT: 61 IN | BODY MASS INDEX: 23.68 KG/M2 | DIASTOLIC BLOOD PRESSURE: 72 MMHG | HEART RATE: 99 BPM | TEMPERATURE: 98.4 F | WEIGHT: 125.44 LBS | OXYGEN SATURATION: 97 % | SYSTOLIC BLOOD PRESSURE: 116 MMHG | RESPIRATION RATE: 16 BRPM

## 2018-03-07 NOTE — PROGRESS NOTES
Education  [employer] Education 3rd Trimester: Third Trimester Education provided:   benefits of breastfeeding, importance of exclusive breastfeeding, early initiation of breastfeeding, exclusive breastfeeding for the first 6 months, frequent feedings for optimal milk production, feeding on demand/baby-led feedings, effective positioning and attachment, importance of breastfeeding after 6 months following introduction of other foods, non-pharmacologic pain relief methods for labor, importance of early skin-to-skin contact and 28 week packet given  rooming-in on 24 hour basis Pregnancy Essentials Reference Guide given   The patient is planning on breastfeeding  The patient is planning on exclusively breastfeeding until the baby is 10months of age  Mother has not registered for prenatal class  Thought has not been given to selecting a pediatrician  The mother has not discussed personal preferences with her provider     Prenatal education provided by: Rosy Forman RN      Signatures   Electronically signed by : Rosy Forman, ; Jan 30 2017 11:03AM EST                       (Author)

## 2018-03-07 NOTE — PROGRESS NOTES
Education  [employer] Education Post Partum:   Post Partum Education provided:   benefits of breastfeeding, importance of exclusive breastfeeding, exclusive breastfeeding for the first 6 months, importance of breastfeeding after 6 months following introduction of other foods, frequent feedings for optimal milk production, feeding on demand/baby-led feedings and effective positioning and attachment  The patient is breastfeeding  The patient is planning on exclusively breastfeeding until the baby is 10months of age  The baby has seen a pediatrician   The pediatrician is in the network Pediatrician name: Katia Fung [facility]   Post partum education provided by: Javid Arnett      Signatures   Electronically signed by : Javid Arnett, ; May  5 2017  7:27AM EST                       (Author)

## 2021-03-15 ENCOUNTER — TELEPHONE (OUTPATIENT)
Dept: FAMILY MEDICINE CLINIC | Facility: CLINIC | Age: 41
End: 2021-03-15

## 2021-09-27 ENCOUNTER — OFFICE VISIT (OUTPATIENT)
Dept: FAMILY MEDICINE CLINIC | Facility: CLINIC | Age: 41
End: 2021-09-27

## 2021-09-27 VITALS
DIASTOLIC BLOOD PRESSURE: 68 MMHG | TEMPERATURE: 98 F | RESPIRATION RATE: 18 BRPM | BODY MASS INDEX: 25.22 KG/M2 | OXYGEN SATURATION: 99 % | SYSTOLIC BLOOD PRESSURE: 106 MMHG | WEIGHT: 133.6 LBS | HEART RATE: 78 BPM | HEIGHT: 61 IN

## 2021-09-27 DIAGNOSIS — Z23 FLU VACCINE NEED: ICD-10-CM

## 2021-09-27 DIAGNOSIS — Z12.31 BREAST CANCER SCREENING BY MAMMOGRAM: ICD-10-CM

## 2021-09-27 DIAGNOSIS — B35.1 NAIL FUNGUS: ICD-10-CM

## 2021-09-27 DIAGNOSIS — R20.2 TINGLING: Primary | ICD-10-CM

## 2021-09-27 DIAGNOSIS — M54.2 NECK PAIN: ICD-10-CM

## 2021-09-27 DIAGNOSIS — L65.9 HAIR LOSS: ICD-10-CM

## 2021-09-27 PROCEDURE — 90471 IMMUNIZATION ADMIN: CPT | Performed by: PHYSICIAN ASSISTANT

## 2021-09-27 PROCEDURE — 99214 OFFICE O/P EST MOD 30 MIN: CPT | Performed by: PHYSICIAN ASSISTANT

## 2021-09-27 PROCEDURE — 90682 RIV4 VACC RECOMBINANT DNA IM: CPT | Performed by: PHYSICIAN ASSISTANT

## 2021-09-27 RX ORDER — TERBINAFINE HYDROCHLORIDE 250 MG/1
250 TABLET ORAL DAILY
Qty: 30 TABLET | Refills: 2 | Status: SHIPPED | OUTPATIENT
Start: 2021-09-27 | End: 2021-10-27

## 2021-09-27 RX ORDER — FOLIC ACID 1 MG/1
1 TABLET ORAL DAILY
COMMUNITY
Start: 2021-06-21 | End: 2022-06-21

## 2021-09-27 NOTE — PROGRESS NOTES
Assessment/Plan:    Neck pain  Recommend starting PT  Declined muscle relaxant at this time  Follow up in 2 months         Problem List Items Addressed This Visit        Other    Neck pain     Recommend starting PT  Declined muscle relaxant at this time  Follow up in 2 months         Relevant Orders    Ambulatory referral to Physical Therapy      Other Visit Diagnoses     Tingling    -  Primary    Relevant Orders    Glucose, fasting    Nail fungus        Relevant Medications    terbinafine (LamISIL) 250 mg tablet    Breast cancer screening by mammogram        Relevant Orders    Mammo screening bilateral w 3d & cad    Flu vaccine need        Relevant Orders    influenza vaccine, quadrivalent, recombinant, PF, 0 5 mL, for patients 18 yr+ (FLUBLOK) (Completed)    Hair loss        Relevant Orders    TSH, 3rd generation with Free T4 reflex    BMI 25 0-25 9,adult        Relevant Orders    Lipid panel          cyracom  Subjective:      Patient ID: Vance Lerma is a 42 y o  female  HPI  42year old F with RA here for follow up of left ankle pain  She is seeing podiatry and had MRI 3 days ago  It still continues  She has had injections and they ddont help  She will be going back to see them soon  Also on enbrel for RA and that helps some  She also had nail fungus on toes in thepast and wants to start medication  She tried topicals before and they didn't help  She has had tingling in right lower leg and left arm  The arm is less  It has  Been about 3 weeks  No injury  She does get chronic neck pain which is more of a pressure  It has been going on for years  She has has normal xrays in the past         The following portions of the patient's history were reviewed and updated as appropriate:   She  has a past medical history of Diabetes mellitus ([address] ) and Varicosities of leg    She   Patient Active Problem List    Diagnosis Date Noted    Neck pain 09/28/2021    Esophagitis due to drug 08/06/2020    Chronic fatigue 03/14/2019    Rheumatoid arthritis involving multiple sites with positive rheumatoid factor ([facility] [address] ) 08/28/2017    Hx of tuberculosis 07/21/2017     She  has a past surgical history that includes Appendectomy  Her family history is not on file  She  reports that she has never smoked  She does not have any smokeless tobacco history on file  She reports that she does not drink alcohol and does not use drugs  Current Outpatient Medications   Medication Sig Dispense Refill    folic acid (FOLVITE) 1 mg tablet Take 1 mg by mouth daily      methotrexate 2 5 mg tablet Take by mouth      acetaminophen (TYLENOL) 325 mg tablet Take 2 tablets by mouth every 4 (four) hours as needed for headaches (Patient not taking: Reported on 9/27/2021) 30 tablet 0    benzocaine-menthol-lanolin-aloe (DERMOPLAST) 20-0 5 % topical spray Apply 1 application topically 4 (four) times a day as needed for mild pain for up to 30 days  0    docusate sodium (COLACE) 100 mg capsule Take 1 capsule by mouth 2 (two) times a day for 30 days 60 capsule 0    hydrocortisone 1 % cream Apply 1 application topically as needed for irritation for up to 30 days 30 g 0    ibuprofen (MOTRIN) 600 mg tablet Take 1 tablet by mouth every 6 (six) hours as needed for mild pain for up to 30 days 30 tablet 0    Prenatal Vit-DSS-Fe Fum-FA (PRENATAL 19) tablet Take by mouth (Patient not taking: Reported on 9/27/2021)      terbinafine (LamISIL) 250 mg tablet Take 1 tablet (250 mg total) by mouth daily 30 tablet 2    witch hazel-glycerin (TUCKS) topical pad Apply 1 pad topically as needed for irritation for up to 30 days 40 each 0     No current facility-administered medications for this visit       Current Outpatient Medications on File Prior to Visit   Medication Sig    folic acid (FOLVITE) 1 mg tablet Take 1 mg by mouth daily    methotrexate 2 5 mg tablet Take by mouth    acetaminophen (TYLENOL) 325 mg tablet Take 2 tablets by mouth every 4 (four) hours as needed for headaches (Patient not taking: Reported on 9/27/2021)    benzocaine-menthol-lanolin-aloe (DERMOPLAST) 20-0 5 % topical spray Apply 1 application topically 4 (four) times a day as needed for mild pain for up to 30 days    docusate sodium (COLACE) 100 mg capsule Take 1 capsule by mouth 2 (two) times a day for 30 days    hydrocortisone 1 % cream Apply 1 application topically as needed for irritation for up to 30 days    ibuprofen (MOTRIN) 600 mg tablet Take 1 tablet by mouth every 6 (six) hours as needed for mild pain for up to 30 days    Prenatal Vit-DSS-Fe Fum-FA (PRENATAL 19) tablet Take by mouth (Patient not taking: Reported on 9/27/2021)    witch hazel-glycerin (TUCKS) topical pad Apply 1 pad topically as needed for irritation for up to 30 days     No current facility-administered medications on file prior to visit  She is allergic to latex       Review of Systems   Constitutional: Negative for activity change, appetite change, chills, fatigue and unexpected weight change  HENT: Negative for dental problem, ear pain, hearing loss and sore throat  Eyes: Negative for visual disturbance  Respiratory: Negative for cough and wheezing  Cardiovascular: Negative for chest pain  Gastrointestinal: Negative for abdominal pain, constipation, diarrhea and vomiting  Genitourinary: Negative for difficulty urinating and dysuria  Musculoskeletal: Negative for arthralgias, back pain and myalgias  Skin: Negative for rash  Hair loss     Neurological: Positive for dizziness and headaches  Psychiatric/Behavioral: Negative for behavioral problems           Objective:      /68 (BP Location: Right arm, Patient Position: Sitting, Cuff Size: Standard)   Pulse 78   Temp 98 °F (36 7 °C) (Temporal)   Resp 18   Ht 5' 1" (1 549 m)   Wt 60 6 kg (133 lb 9 6 oz)   LMP  (LMP Unknown)   SpO2 99%   Breastfeeding No   BMI 25 24 kg/m²          Physical Exam  Vitals and nursing note reviewed  Constitutional:       Appearance: She is well-developed  HENT:      Head: Normocephalic and atraumatic  Right Ear: External ear normal       Left Ear: External ear normal       Nose: Nose normal    Eyes:      Conjunctiva/sclera: Conjunctivae normal    Neck:      Thyroid: No thyromegaly  Cardiovascular:      Rate and Rhythm: Normal rate and regular rhythm  Heart sounds: Normal heart sounds  No murmur heard  Pulmonary:      Effort: Pulmonary effort is normal  No respiratory distress  Breath sounds: Normal breath sounds  Abdominal:      General: Bowel sounds are normal       Palpations: Abdomen is soft  There is no mass  Tenderness: There is no abdominal tenderness  There is no guarding  Musculoskeletal:         General: Tenderness (bilateral scm) present  No swelling or deformity  Normal range of motion  Cervical back: Normal range of motion and neck supple  Lymphadenopathy:      Cervical: No cervical adenopathy  Skin:     General: Skin is warm  Comments: Toenails crumbling with white d/c under     Neurological:      Mental Status: She is alert and oriented to person, place, and time  Sensory: No sensory deficit  Motor: No weakness        Gait: Gait normal    Psychiatric:         Mood and Affect: Mood normal          Behavior: Behavior normal

## 2021-09-28 PROBLEM — O42.90 PROM (PREMATURE RUPTURE OF MEMBRANES): Status: RESOLVED | Noted: 2017-04-10 | Resolved: 2021-09-28

## 2021-09-28 PROBLEM — M05.79 RHEUMATOID ARTHRITIS INVOLVING MULTIPLE SITES WITH POSITIVE RHEUMATOID FACTOR (HCC): Status: ACTIVE | Noted: 2017-08-28

## 2021-09-28 PROBLEM — Z3A.37 37 WEEKS GESTATION OF PREGNANCY: Status: RESOLVED | Noted: 2017-04-10 | Resolved: 2021-09-28

## 2021-09-28 PROBLEM — K20.80 ESOPHAGITIS DUE TO DRUG: Status: ACTIVE | Noted: 2020-08-06

## 2021-09-28 PROBLEM — T36.4X5A ESOPHAGITIS DUE TO DRUG: Status: ACTIVE | Noted: 2020-08-06

## 2021-09-28 PROBLEM — R53.82 CHRONIC FATIGUE: Status: ACTIVE | Noted: 2019-03-14

## 2021-09-28 PROBLEM — Z3A.40 40 WEEKS GESTATION OF PREGNANCY: Status: RESOLVED | Noted: 2017-04-10 | Resolved: 2021-09-28

## 2021-09-28 PROBLEM — Z86.11 HX OF TUBERCULOSIS: Status: ACTIVE | Noted: 2017-07-21

## 2021-09-28 PROBLEM — M54.2 NECK PAIN: Status: ACTIVE | Noted: 2021-09-28

## 2021-09-29 ENCOUNTER — APPOINTMENT (OUTPATIENT)
Dept: LAB | Facility: HOSPITAL | Age: 41
End: 2021-09-29
Payer: COMMERCIAL

## 2021-09-29 DIAGNOSIS — R20.2 TINGLING: ICD-10-CM

## 2021-09-29 DIAGNOSIS — L65.9 HAIR LOSS: ICD-10-CM

## 2021-09-29 LAB
CHOLEST SERPL-MCNC: 217 MG/DL (ref 50–200)
GLUCOSE P FAST SERPL-MCNC: 92 MG/DL (ref 65–99)
HDLC SERPL-MCNC: 52 MG/DL
LDLC SERPL CALC-MCNC: 151 MG/DL (ref 0–100)
NONHDLC SERPL-MCNC: 165 MG/DL
TRIGL SERPL-MCNC: 71 MG/DL
TSH SERPL DL<=0.05 MIU/L-ACNC: 1.35 UIU/ML (ref 0.36–3.74)

## 2021-09-29 PROCEDURE — 84443 ASSAY THYROID STIM HORMONE: CPT

## 2021-09-29 PROCEDURE — 36415 COLL VENOUS BLD VENIPUNCTURE: CPT

## 2021-09-29 PROCEDURE — 80061 LIPID PANEL: CPT

## 2021-09-29 PROCEDURE — 82947 ASSAY GLUCOSE BLOOD QUANT: CPT

## 2021-10-11 ENCOUNTER — TELEPHONE (OUTPATIENT)
Dept: FAMILY MEDICINE CLINIC | Facility: CLINIC | Age: 41
End: 2021-10-11

## 2021-11-04 ENCOUNTER — OFFICE VISIT (OUTPATIENT)
Dept: URGENT CARE | Age: 41
End: 2021-11-04
Payer: COMMERCIAL

## 2021-11-04 VITALS
OXYGEN SATURATION: 99 % | HEART RATE: 88 BPM | RESPIRATION RATE: 18 BRPM | TEMPERATURE: 98.6 F | HEIGHT: 61 IN | BODY MASS INDEX: 25.11 KG/M2 | WEIGHT: 133 LBS

## 2021-11-04 DIAGNOSIS — B34.9 ACUTE VIRAL SYNDROME: Primary | ICD-10-CM

## 2021-11-04 DIAGNOSIS — Z20.822 EXPOSURE TO 2019 NOVEL CORONAVIRUS: ICD-10-CM

## 2021-11-04 DIAGNOSIS — J02.9 SORE THROAT: ICD-10-CM

## 2021-11-04 LAB — S PYO AG THROAT QL: NEGATIVE

## 2021-11-04 PROCEDURE — G0382 LEV 3 HOSP TYPE B ED VISIT: HCPCS | Performed by: PHYSICIAN ASSISTANT

## 2021-11-04 PROCEDURE — U0005 INFEC AGEN DETEC AMPLI PROBE: HCPCS | Performed by: PHYSICIAN ASSISTANT

## 2021-11-04 PROCEDURE — U0003 INFECTIOUS AGENT DETECTION BY NUCLEIC ACID (DNA OR RNA); SEVERE ACUTE RESPIRATORY SYNDROME CORONAVIRUS 2 (SARS-COV-2) (CORONAVIRUS DISEASE [COVID-19]), AMPLIFIED PROBE TECHNIQUE, MAKING USE OF HIGH THROUGHPUT TECHNOLOGIES AS DESCRIBED BY CMS-2020-01-R: HCPCS | Performed by: PHYSICIAN ASSISTANT

## 2021-11-04 PROCEDURE — 87880 STREP A ASSAY W/OPTIC: CPT | Performed by: PHYSICIAN ASSISTANT

## 2021-11-04 PROCEDURE — 87070 CULTURE OTHR SPECIMN AEROBIC: CPT | Performed by: PHYSICIAN ASSISTANT

## 2021-11-04 RX ORDER — METHOTREXATE SODIUM 25 MG/ML
12.5 INJECTION, SOLUTION INTRA-ARTERIAL; INTRAMUSCULAR; INTRAVENOUS
COMMUNITY
Start: 2021-09-28 | End: 2022-06-15

## 2021-11-04 RX ORDER — ETANERCEPT 50 MG/ML
50 SOLUTION SUBCUTANEOUS WEEKLY
COMMUNITY
Start: 2021-09-28

## 2021-11-05 LAB — SARS-COV-2 RNA RESP QL NAA+PROBE: POSITIVE

## 2021-11-06 LAB — BACTERIA THROAT CULT: NORMAL

## 2021-11-22 ENCOUNTER — OFFICE VISIT (OUTPATIENT)
Dept: FAMILY MEDICINE CLINIC | Facility: CLINIC | Age: 41
End: 2021-11-22

## 2021-11-22 VITALS
SYSTOLIC BLOOD PRESSURE: 102 MMHG | WEIGHT: 128 LBS | HEIGHT: 61 IN | OXYGEN SATURATION: 97 % | DIASTOLIC BLOOD PRESSURE: 60 MMHG | HEART RATE: 77 BPM | BODY MASS INDEX: 24.17 KG/M2 | RESPIRATION RATE: 16 BRPM | TEMPERATURE: 97.7 F

## 2021-11-22 DIAGNOSIS — M05.79 RHEUMATOID ARTHRITIS INVOLVING MULTIPLE SITES WITH POSITIVE RHEUMATOID FACTOR (HCC): ICD-10-CM

## 2021-11-22 DIAGNOSIS — B35.1 NAIL FUNGUS: Primary | ICD-10-CM

## 2021-11-22 DIAGNOSIS — G93.3 POSTVIRAL FATIGUE SYNDROME: ICD-10-CM

## 2021-11-22 DIAGNOSIS — M54.2 NECK PAIN: ICD-10-CM

## 2021-11-22 PROBLEM — Z86.16 HISTORY OF COVID-19: Status: ACTIVE | Noted: 2021-11-22

## 2021-11-22 PROCEDURE — 99214 OFFICE O/P EST MOD 30 MIN: CPT | Performed by: PHYSICIAN ASSISTANT

## 2021-11-22 PROCEDURE — 1036F TOBACCO NON-USER: CPT | Performed by: PHYSICIAN ASSISTANT

## 2021-11-22 PROCEDURE — 3008F BODY MASS INDEX DOCD: CPT | Performed by: PHYSICIAN ASSISTANT

## 2022-06-14 ENCOUNTER — OFFICE VISIT (OUTPATIENT)
Dept: FAMILY MEDICINE CLINIC | Facility: CLINIC | Age: 42
End: 2022-06-14

## 2022-06-14 VITALS
TEMPERATURE: 97 F | HEIGHT: 61 IN | RESPIRATION RATE: 16 BRPM | SYSTOLIC BLOOD PRESSURE: 108 MMHG | OXYGEN SATURATION: 98 % | BODY MASS INDEX: 23.33 KG/M2 | DIASTOLIC BLOOD PRESSURE: 64 MMHG | WEIGHT: 123.6 LBS | HEART RATE: 73 BPM

## 2022-06-14 DIAGNOSIS — Z12.4 CERVICAL CANCER SCREENING: ICD-10-CM

## 2022-06-14 DIAGNOSIS — Z23 ENCOUNTER FOR IMMUNIZATION: ICD-10-CM

## 2022-06-14 DIAGNOSIS — H53.9 VISION DISTURBANCE: ICD-10-CM

## 2022-06-14 DIAGNOSIS — Z13.0 SCREENING, ANEMIA, DEFICIENCY, IRON: ICD-10-CM

## 2022-06-14 DIAGNOSIS — Z00.00 ANNUAL PHYSICAL EXAM: Primary | ICD-10-CM

## 2022-06-14 DIAGNOSIS — Z13.1 SCREENING FOR DIABETES MELLITUS: ICD-10-CM

## 2022-06-14 DIAGNOSIS — Z12.31 SCREENING MAMMOGRAM FOR BREAST CANCER: ICD-10-CM

## 2022-06-14 DIAGNOSIS — E78.2 MIXED HYPERLIPIDEMIA: ICD-10-CM

## 2022-06-14 DIAGNOSIS — M05.79 RHEUMATOID ARTHRITIS INVOLVING MULTIPLE SITES WITH POSITIVE RHEUMATOID FACTOR (HCC): ICD-10-CM

## 2022-06-14 DIAGNOSIS — Z91.89 NEED FOR DENTAL CARE: ICD-10-CM

## 2022-06-14 PROCEDURE — 3008F BODY MASS INDEX DOCD: CPT | Performed by: NURSE PRACTITIONER

## 2022-06-14 PROCEDURE — 3725F SCREEN DEPRESSION PERFORMED: CPT | Performed by: NURSE PRACTITIONER

## 2022-06-14 PROCEDURE — 99396 PREV VISIT EST AGE 40-64: CPT | Performed by: NURSE PRACTITIONER

## 2022-06-14 PROCEDURE — 1036F TOBACCO NON-USER: CPT | Performed by: NURSE PRACTITIONER

## 2022-06-14 PROCEDURE — 90677 PCV20 VACCINE IM: CPT | Performed by: NURSE PRACTITIONER

## 2022-06-14 PROCEDURE — 90471 IMMUNIZATION ADMIN: CPT | Performed by: NURSE PRACTITIONER

## 2022-06-14 NOTE — PATIENT INSTRUCTIONS

## 2022-06-14 NOTE — PROGRESS NOTES
[address] [facility]    NAME: Britt Brar  AGE: 42 y o  SEX: female  : 1980     DATE: 6/15/2022     Assessment and Plan:     Problem List Items Addressed This Visit        Musculoskeletal and Integument    Rheumatoid arthritis involving multiple sites with positive rheumatoid factor (HCC)    Relevant Orders    TSH, 3rd generation with Free T4 reflex       Other    Vision disturbance    Relevant Orders    Ambulatory Referral to Ophthalmology      Other Visit Diagnoses     Annual physical exam    -  Primary    Screening mammogram for breast cancer        Relevant Orders    Mammo screening bilateral w 3d & cad    Screening, anemia, deficiency, iron        Relevant Orders    CBC and differential    Screening for diabetes mellitus        Relevant Orders    Comprehensive metabolic panel    Mixed hyperlipidemia        Relevant Orders    Lipid panel    Encounter for immunization        Relevant Orders    Pneumococcal Conjugate Vaccine 20-valent (Pcv20) (Completed)    Need for dental care        Relevant Orders    Ambulatory Referral to Dentistry    Cervical cancer screening        Relevant Orders    Ambulatory Referral to Obstetrics / Gynecology          Immunizations and preventive care screenings were discussed with patient today  Appropriate education was printed on patient's after visit summary  Counseling:  Alcohol/drug use: discussed moderation in alcohol intake, the recommendations for healthy alcohol use, and avoidance of illicit drug use  Dental Health: discussed importance of regular tooth brushing, flossing, and dental visits  Injury prevention: discussed safety/seat belts, safety helmets, smoke detectors, carbon dioxide detectors, and smoking near bedding or upholstery    Sexual health: discussed sexually transmitted diseases, partner selection, use of condoms, avoidance of unintended pregnancy, and contraceptive alternatives  · Exercise: the importance of regular exercise/physical activity was discussed  Recommend exercise 3-5 times per week for at least 30 minutes  Depression Screening and Follow-up Plan: Patient was screened for depression during today's encounter  They screened negative with a PHQ-2 score of 0  Return in about 6 months (around 12/14/2022)  Chief Complaint:     Chief Complaint   Patient presents with    Physical Exam     43 y/o       History of Present Illness:     Adult Annual Physical   Patient here for a comprehensive physical exam  The patient reports that overall her RA and subsequent joint pain have been manageable  She follows closely with rheumatology and is currently on Enbrel weekly and prednisone  She would like to see an ophthalmologist, she is having blurred vision and is worried that the RA has caused problems with her eyes  Diet and Physical Activity  · Diet/Nutrition: well balanced diet  · Exercise: walking  Depression Screening  PHQ-2/9 Depression Screening    Little interest or pleasure in doing things: 0 - not at all  Feeling down, depressed, or hopeless: 0 - not at all  PHQ-2 Score: 0  PHQ-2 Interpretation: Negative depression screen       General Health  · Sleep: sleeps well  · Hearing: normal - bilateral   · Vision: vision problems: blurred vision  · Dental: no dental visits for >1 year  /GYN Health  · Patient is: premenopausal   · Last menstrual period: irregular   · Contraceptive method: Nexplannon   Review of Systems:     Review of Systems   Constitutional: Negative for activity change, appetite change, chills, fatigue, fever and unexpected weight change  HENT: Negative for hearing loss, nosebleeds, sinus pain, sneezing, sore throat and trouble swallowing  Eyes: Positive for visual disturbance  Negative for photophobia  Respiratory: Negative for cough, chest tightness, shortness of breath and wheezing  Cardiovascular: Negative for chest pain, palpitations and leg swelling  Gastrointestinal: Negative for abdominal pain, constipation, nausea and vomiting  Genitourinary: Negative for decreased urine volume, difficulty urinating, dysuria, flank pain, genital sores, hematuria and urgency  Musculoskeletal: Positive for arthralgias and joint swelling  Negative for back pain and gait problem  Skin: Negative for pallor, rash and wound  Neurological: Negative for dizziness, seizures, syncope, weakness, numbness and headaches  Hematological: Negative for adenopathy  Does not bruise/bleed easily  Psychiatric/Behavioral: Negative for confusion, hallucinations, self-injury, sleep disturbance and suicidal ideas  The patient is not nervous/anxious  Past Medical History:     Past Medical History:   Diagnosis Date    [facility]     Varicosities of leg       Past Surgical History:     Past Surgical History:   Procedure Laterality Date    APPENDECTOMY        Social History:     Social History     Socioeconomic History    Marital status: /Civil Union     Spouse name: None    Number of children: None    Years of education: None    Highest education level: None   Occupational History    None   Tobacco Use    Smoking status: Never Smoker    Smokeless tobacco: Never Used   Substance and Sexual Activity    Alcohol use: No    Drug use: No    Sexual activity: Yes     Partners: Male     Birth control/protection: None   Other Topics Concern    None   Social History Narrative    None     Social Determinants of Health     Financial Resource Strain: Low Risk     Difficulty of Paying Living Expenses: Not very hard   Food Insecurity: No Food Insecurity    Worried About Running Out of Food in the Last Year: Never true    Kellie of Food in the Last Year: Never true   Transportation Needs: No Transportation Needs    Lack of Transportation (Medical):  No    Lack of Transportation (Non-Medical): No   Physical Activity: Not on file   Stress: Not on file   Social Connections: Not on file   Intimate Partner Violence: Not on file   Housing Stability: Not on file      Family History:     No family history on file  Current Medications:     Current Outpatient Medications   Medication Sig Dispense Refill    acetaminophen (TYLENOL) 325 mg tablet Take 2 tablets by mouth every 4 (four) hours as needed for headaches 30 tablet 0    benzocaine-menthol-lanolin-aloe (DERMOPLAST) 20-0 5 % topical spray Apply 1 application topically 4 (four) times a day as needed for mild pain for up to 30 days  0    docusate sodium (COLACE) 100 mg capsule Take 1 capsule by mouth 2 (two) times a day for 30 days 60 capsule 0    Etanercept (Enbrel Mini) 50 MG/ML SOCT Inject 50 mg under the skin Once a week      folic acid (FOLVITE) 1 mg tablet Take 1 mg by mouth daily (Patient not taking: Reported on 11/4/2021)      hydrocortisone 1 % cream Apply 1 application topically as needed for irritation for up to 30 days 30 g 0    ibuprofen (MOTRIN) 600 mg tablet Take 1 tablet by mouth every 6 (six) hours as needed for mild pain for up to 30 days 30 tablet 0    methotrexate 2 5 mg tablet Take by mouth      Methotrexate Sodium (methotrexate, PF,) 250 MG/10ML injection Inject 12 5 mg under the skin      Prenatal Vit-DSS-Fe Fum-FA (PRENATAL 19) tablet Take by mouth (Patient not taking: Reported on 9/27/2021)      witch hazel-glycerin (TUCKS) topical pad Apply 1 pad topically as needed for irritation for up to 30 days 40 each 0     No current facility-administered medications for this visit  Allergies:      Allergies   Allergen Reactions    Latex       Physical Exam:     /64 (BP Location: Left arm, Patient Position: Sitting, Cuff Size: Standard)   Pulse 73   Temp (!) 97 °F (36 1 °C) (Temporal)   Resp 16   Ht 5' 1" (1 549 m)   Wt 56 1 kg (123 lb 9 6 oz)   SpO2 98%   BMI 23 35 kg/m²     Physical Exam  Vitals and nursing note reviewed  Constitutional:       General: She is not in acute distress  Appearance: Normal appearance  She is not diaphoretic  HENT:      Head: Normocephalic  Right Ear: Tympanic membrane and external ear normal       Left Ear: Tympanic membrane and external ear normal       Nose: Nose normal       Mouth/Throat:      Mouth: Mucous membranes are moist    Eyes:      General:         Right eye: No discharge  Left eye: No discharge  Extraocular Movements: Extraocular movements intact  Conjunctiva/sclera: Conjunctivae normal       Pupils: Pupils are equal, round, and reactive to light  Cardiovascular:      Rate and Rhythm: Normal rate and regular rhythm  Pulses: Normal pulses  Pulmonary:      Effort: Pulmonary effort is normal  No respiratory distress  Breath sounds: Normal breath sounds  Abdominal:      General: Bowel sounds are normal  There is no distension  Palpations: Abdomen is soft  Musculoskeletal:      Right wrist: Swelling and tenderness present  Decreased range of motion  Left wrist: Swelling and tenderness present  Decreased range of motion  Cervical back: Normal range of motion and neck supple  No rigidity  Lymphadenopathy:      Cervical: No cervical adenopathy  Skin:     General: Skin is warm and dry  Capillary Refill: Capillary refill takes less than 2 seconds  Findings: No rash  Neurological:      General: No focal deficit present  Mental Status: She is alert and oriented to person, place, and time     Psychiatric:         Mood and Affect: Mood normal          Behavior: Behavior normal           [facility], [address] [facility]

## 2022-07-01 NOTE — PROGRESS NOTES
Assessment/Plan:     No problem-specific Assessment & Plan notes found for this encounter  Diagnoses and all orders for this visit:    Encounter for gynecological examination (general) (routine) without abnormal findings    Cervical cancer screening  -     Liquid-based pap, screening  -     Ambulatory Referral to Obstetrics / Gynecology    Possible exposure to STD  -     Chlamydia/GC amplified DNA by PCR    IUD (intrauterine device) in place    Rash  -     triamcinolone (KENALOG) 0 1 % ointment; Apply topically 2 (two) times a day    Other orders  -     levonorgestrel (MIRENA) 20 MCG/24HR IUD; 1 each by Intrauterine route once    Depression Screening Follow-up Plan: Patient's depression screening was positive with a PHQ-2 score of   Their PHQ-9 score was 1  Clinically patient does not have depression  No treatment is required  Subjective:      Patient ID: Sydni Vazquez is a 42 y o  female who presents for annual exam  Her last pap was 09/26/16 and was negative with negative HPV  Her mammogram was ordered  She had a mirena placed in 2017  HPI    The following portions of the patient's history were reviewed and updated as appropriate: allergies, current medications, past family history, past medical history, past social history, past surgical history and problem list     Review of Systems      Objective:      /65   Pulse 71   Ht 5' 1" (1 549 m)   Wt 56 3 kg (124 lb 3 2 oz)   LMP  (Approximate) Comment: mirena IUD  BMI 23 47 kg/m²          Physical Exam  Vitals and nursing note reviewed  Exam conducted with a chaperone present  Constitutional:       General: She is not in acute distress  Appearance: She is well-developed  HENT:      Head: Normocephalic  Neck:      Thyroid: No thyromegaly  Cardiovascular:      Rate and Rhythm: Normal rate and regular rhythm  Heart sounds: Normal heart sounds  No murmur heard    Pulmonary:      Effort: Pulmonary effort is normal  No respiratory distress  Breath sounds: Normal breath sounds  No wheezing or rales  Chest:      Chest wall: No tenderness  Breasts:      Right: No swelling, bleeding, inverted nipple, mass, nipple discharge, skin change or tenderness  Left: No swelling, bleeding, inverted nipple, mass, nipple discharge, skin change or tenderness  Abdominal:      General: Bowel sounds are normal  There is no distension  Palpations: Abdomen is soft  There is no mass  Tenderness: There is no abdominal tenderness  There is no guarding or rebound  Comments: Linear rash, slightly raised/red   Genitourinary:     Labia:         Right: No rash, tenderness, lesion or injury  Left: No rash, tenderness, lesion or injury  Vagina: Normal       Cervix: No cervical motion tenderness, discharge or friability  Uterus: Normal        Adnexa:         Right: No mass, tenderness or fullness  Left: No mass, tenderness or fullness  Rectum: No external hemorrhoid  Comments: IUD strings seen  Skin:     General: Skin is warm and dry  Neurological:      Mental Status: She is alert and oriented to person, place, and time  Psychiatric:         Behavior: Behavior normal          Thought Content:  Thought content normal          Judgment: Judgment normal

## 2022-07-06 ENCOUNTER — ANNUAL EXAM (OUTPATIENT)
Dept: OBGYN CLINIC | Facility: CLINIC | Age: 42
End: 2022-07-06

## 2022-07-06 VITALS
DIASTOLIC BLOOD PRESSURE: 65 MMHG | WEIGHT: 124.2 LBS | HEIGHT: 61 IN | BODY MASS INDEX: 23.45 KG/M2 | SYSTOLIC BLOOD PRESSURE: 102 MMHG | HEART RATE: 71 BPM

## 2022-07-06 DIAGNOSIS — Z12.4 CERVICAL CANCER SCREENING: ICD-10-CM

## 2022-07-06 DIAGNOSIS — Z01.419 ENCOUNTER FOR GYNECOLOGICAL EXAMINATION (GENERAL) (ROUTINE) WITHOUT ABNORMAL FINDINGS: Primary | ICD-10-CM

## 2022-07-06 DIAGNOSIS — Z97.5 IUD (INTRAUTERINE DEVICE) IN PLACE: ICD-10-CM

## 2022-07-06 DIAGNOSIS — Z20.2 POSSIBLE EXPOSURE TO STD: ICD-10-CM

## 2022-07-06 DIAGNOSIS — R21 RASH: ICD-10-CM

## 2022-07-06 PROCEDURE — 87591 N.GONORRHOEAE DNA AMP PROB: CPT | Performed by: OBSTETRICS & GYNECOLOGY

## 2022-07-06 PROCEDURE — G0145 SCR C/V CYTO,THINLAYER,RESCR: HCPCS | Performed by: OBSTETRICS & GYNECOLOGY

## 2022-07-06 PROCEDURE — 0503F POSTPARTUM CARE VISIT: CPT | Performed by: OBSTETRICS & GYNECOLOGY

## 2022-07-06 PROCEDURE — 1036F TOBACCO NON-USER: CPT | Performed by: OBSTETRICS & GYNECOLOGY

## 2022-07-06 PROCEDURE — 99396 PREV VISIT EST AGE 40-64: CPT | Performed by: OBSTETRICS & GYNECOLOGY

## 2022-07-06 PROCEDURE — G0476 HPV COMBO ASSAY CA SCREEN: HCPCS | Performed by: OBSTETRICS & GYNECOLOGY

## 2022-07-06 PROCEDURE — 3008F BODY MASS INDEX DOCD: CPT | Performed by: OBSTETRICS & GYNECOLOGY

## 2022-07-06 PROCEDURE — 87491 CHLMYD TRACH DNA AMP PROBE: CPT | Performed by: OBSTETRICS & GYNECOLOGY

## 2022-07-06 PROCEDURE — 3725F SCREEN DEPRESSION PERFORMED: CPT | Performed by: OBSTETRICS & GYNECOLOGY

## 2022-07-08 LAB
C TRACH DNA SPEC QL NAA+PROBE: NEGATIVE
HPV HR 12 DNA CVX QL NAA+PROBE: NEGATIVE
HPV16 DNA CVX QL NAA+PROBE: NEGATIVE
HPV18 DNA CVX QL NAA+PROBE: NEGATIVE
N GONORRHOEA DNA SPEC QL NAA+PROBE: NEGATIVE

## 2022-07-12 ENCOUNTER — APPOINTMENT (OUTPATIENT)
Dept: LAB | Facility: CLINIC | Age: 42
End: 2022-07-12
Payer: COMMERCIAL

## 2022-07-12 DIAGNOSIS — B35.1 NAIL FUNGUS: ICD-10-CM

## 2022-07-12 DIAGNOSIS — E78.2 MIXED HYPERLIPIDEMIA: ICD-10-CM

## 2022-07-12 DIAGNOSIS — M05.79 RHEUMATOID ARTHRITIS INVOLVING MULTIPLE SITES WITH POSITIVE RHEUMATOID FACTOR (HCC): ICD-10-CM

## 2022-07-12 DIAGNOSIS — Z13.0 SCREENING, ANEMIA, DEFICIENCY, IRON: ICD-10-CM

## 2022-07-12 DIAGNOSIS — G93.31 POSTVIRAL FATIGUE SYNDROME: ICD-10-CM

## 2022-07-12 DIAGNOSIS — Z13.1 SCREENING FOR DIABETES MELLITUS: ICD-10-CM

## 2022-07-12 LAB
ALBUMIN SERPL BCP-MCNC: 4 G/DL (ref 3.5–5)
ALP SERPL-CCNC: 102 U/L (ref 46–116)
ALT SERPL W P-5'-P-CCNC: 33 U/L (ref 12–78)
ANION GAP SERPL CALCULATED.3IONS-SCNC: 1 MMOL/L (ref 4–13)
AST SERPL W P-5'-P-CCNC: 19 U/L (ref 5–45)
BASOPHILS # BLD AUTO: 0.03 THOUSANDS/ΜL (ref 0–0.1)
BASOPHILS NFR BLD AUTO: 1 % (ref 0–1)
BILIRUB SERPL-MCNC: 0.9 MG/DL (ref 0.2–1)
BUN SERPL-MCNC: 13 MG/DL (ref 5–25)
CALCIUM SERPL-MCNC: 9.7 MG/DL (ref 8.3–10.1)
CHLORIDE SERPL-SCNC: 109 MMOL/L (ref 100–108)
CHOLEST SERPL-MCNC: 176 MG/DL
CO2 SERPL-SCNC: 29 MMOL/L (ref 21–32)
CREAT SERPL-MCNC: 0.69 MG/DL (ref 0.6–1.3)
EOSINOPHIL # BLD AUTO: 0.09 THOUSAND/ΜL (ref 0–0.61)
EOSINOPHIL NFR BLD AUTO: 2 % (ref 0–6)
ERYTHROCYTE [DISTWIDTH] IN BLOOD BY AUTOMATED COUNT: 13.1 % (ref 11.6–15.1)
ERYTHROCYTE [SEDIMENTATION RATE] IN BLOOD: 25 MM/HOUR (ref 0–19)
GFR SERPL CREATININE-BSD FRML MDRD: 107 ML/MIN/1.73SQ M
GLUCOSE P FAST SERPL-MCNC: 90 MG/DL (ref 65–99)
HCT VFR BLD AUTO: 40.9 % (ref 34.8–46.1)
HDLC SERPL-MCNC: 44 MG/DL
HGB BLD-MCNC: 13.1 G/DL (ref 11.5–15.4)
IMM GRANULOCYTES # BLD AUTO: 0.01 THOUSAND/UL (ref 0–0.2)
IMM GRANULOCYTES NFR BLD AUTO: 0 % (ref 0–2)
LAB AP GYN PRIMARY INTERPRETATION: NORMAL
LDLC SERPL CALC-MCNC: 121 MG/DL (ref 0–100)
LYMPHOCYTES # BLD AUTO: 1.72 THOUSANDS/ΜL (ref 0.6–4.47)
LYMPHOCYTES NFR BLD AUTO: 34 % (ref 14–44)
Lab: NORMAL
MCH RBC QN AUTO: 28.3 PG (ref 26.8–34.3)
MCHC RBC AUTO-ENTMCNC: 32 G/DL (ref 31.4–37.4)
MCV RBC AUTO: 88 FL (ref 82–98)
MONOCYTES # BLD AUTO: 0.37 THOUSAND/ΜL (ref 0.17–1.22)
MONOCYTES NFR BLD AUTO: 7 % (ref 4–12)
NEUTROPHILS # BLD AUTO: 2.88 THOUSANDS/ΜL (ref 1.85–7.62)
NEUTS SEG NFR BLD AUTO: 56 % (ref 43–75)
NONHDLC SERPL-MCNC: 132 MG/DL
NRBC BLD AUTO-RTO: 0 /100 WBCS
PLATELET # BLD AUTO: 246 THOUSANDS/UL (ref 149–390)
PMV BLD AUTO: 10.1 FL (ref 8.9–12.7)
POTASSIUM SERPL-SCNC: 4.3 MMOL/L (ref 3.5–5.3)
PROT SERPL-MCNC: 7.9 G/DL (ref 6.4–8.2)
RBC # BLD AUTO: 4.63 MILLION/UL (ref 3.81–5.12)
SODIUM SERPL-SCNC: 139 MMOL/L (ref 136–145)
TRIGL SERPL-MCNC: 56 MG/DL
TSH SERPL DL<=0.05 MIU/L-ACNC: 1.5 UIU/ML (ref 0.45–4.5)
WBC # BLD AUTO: 5.1 THOUSAND/UL (ref 4.31–10.16)

## 2022-07-12 PROCEDURE — 85025 COMPLETE CBC W/AUTO DIFF WBC: CPT

## 2022-07-12 PROCEDURE — 80053 COMPREHEN METABOLIC PANEL: CPT

## 2022-07-12 PROCEDURE — 85652 RBC SED RATE AUTOMATED: CPT

## 2022-07-12 PROCEDURE — 80061 LIPID PANEL: CPT

## 2022-07-12 PROCEDURE — 84443 ASSAY THYROID STIM HORMONE: CPT

## 2022-07-12 PROCEDURE — 36415 COLL VENOUS BLD VENIPUNCTURE: CPT

## 2022-09-23 ENCOUNTER — HOSPITAL ENCOUNTER (OUTPATIENT)
Dept: MAMMOGRAPHY | Facility: MEDICAL CENTER | Age: 42
Discharge: HOME | End: 2022-09-23
Payer: COMMERCIAL

## 2022-09-23 VITALS — HEIGHT: 61 IN | BODY MASS INDEX: 23.41 KG/M2 | WEIGHT: 124 LBS

## 2022-09-23 DIAGNOSIS — Z12.31 SCREENING MAMMOGRAM FOR BREAST CANCER: ICD-10-CM

## 2022-09-23 PROCEDURE — 77063 BREAST TOMOSYNTHESIS BI: CPT

## 2022-09-23 PROCEDURE — 77067 SCR MAMMO BI INCL CAD: CPT

## 2022-12-22 ENCOUNTER — IMMUNIZATIONS (OUTPATIENT)
Dept: FAMILY MEDICINE CLINIC | Facility: CLINIC | Age: 42
End: 2022-12-22

## 2022-12-22 DIAGNOSIS — Z23 ENCOUNTER FOR IMMUNIZATION: Primary | ICD-10-CM

## 2023-09-22 ENCOUNTER — ANNUAL EXAM (OUTPATIENT)
Dept: OBGYN CLINIC | Facility: CLINIC | Age: 43
End: 2023-09-22

## 2023-09-22 VITALS
DIASTOLIC BLOOD PRESSURE: 69 MMHG | WEIGHT: 135.2 LBS | HEIGHT: 61 IN | BODY MASS INDEX: 25.53 KG/M2 | HEART RATE: 69 BPM | SYSTOLIC BLOOD PRESSURE: 106 MMHG

## 2023-09-22 DIAGNOSIS — Z12.31 ENCOUNTER FOR SCREENING MAMMOGRAM FOR MALIGNANT NEOPLASM OF BREAST: ICD-10-CM

## 2023-09-22 DIAGNOSIS — Z01.419 ENCOUNTER FOR ANNUAL ROUTINE GYNECOLOGICAL EXAMINATION: Primary | ICD-10-CM

## 2023-09-22 PROCEDURE — S0612 ANNUAL GYNECOLOGICAL EXAMINA: HCPCS | Performed by: NURSE PRACTITIONER

## 2023-09-22 NOTE — PROGRESS NOTES
Annual Exam    Assessment   1. Encounter for annual routine gynecological examination        2. Encounter for screening mammogram for malignant neoplasm of breast  Mammo screening bilateral w 3d & cad        well woman       Plan       All questions answered. Breast self exam technique reviewed and patient encouraged to perform self-exam monthly. Contraception: IUD. Discussed healthy lifestyle modifications. Follow up in 1 year. Mammogram.     Patient Instructions   Schedule mammogram after 9/22/3/2023  Call with needs or concerns  Return in 1 year  Pt verbalized understanding of all discussed. Nick Coelho is a 39 y.o.  female who presents for annual well woman exam. Periods are not occurring with Mirena, lasting 0 days. No vaginal discharge. Last WNL mammogram was 2022  Last WNL PAP and negative HPV  1 partner x 20 years, denies domestic violence    Depression Screening Follow-up Plan: Patient's depression screening was negative with a PHQ-2 score of 0. Their PHQ-9 score was 1. Clinically patient does not have depression. No treatment is required. Current contraception: IUD  History of abnormal Pap smear: no  Family history of uterine or ovarian cancer: no  Regular self breast exam: yes  History of abnormal mammogram: no  Family history of breast cancer: no  History of abnormal lipids: unknown  Menstrual History:  OB History        4    Para   4    Term   4            AB        Living   4       SAB        IAB        Ectopic        Multiple   0    Live Births   3                Menarche age: 5  No LMP recorded (lmp unknown). Patient has had an implant. The following portions of the patient's history were reviewed and updated as appropriate: allergies, current medications, past family history, past medical history, past social history, past surgical history and problem list.    Review of Systems  Pertinent items are noted in HPI.       Objective /69   Pulse 69   Ht 5' 1" (1.549 m)   Wt 61.3 kg (135 lb 3.2 oz)   LMP  (LMP Unknown)   BMI 25.55 kg/m²     General: alert and oriented, in no acute distress, alert, appears stated age and cooperative   Heart: regular rate and rhythm, S1, S2 normal, no murmur, click, rub or gallop   Lungs: clear to auscultation bilaterally, WNL respiratory effort, negative cough or SOB   Thyroid: Negative masses   Abdomen: soft, non-tender, without masses or organomegaly   Vulva: normal   Vagina: normal mucosa   Cervix: no cervical motion tenderness, no lesions and Mirena IUD string noted at cervical OS   Uterus: normal size, non-tender, normal shape and consistency   Adnexa: normal adnexa   Urethra: normal   Breasts: NT,negative masses, discharge, or dimpling

## 2024-02-09 ENCOUNTER — APPOINTMENT (OUTPATIENT)
Dept: LAB | Facility: HOSPITAL | Age: 44
End: 2024-02-09

## 2024-02-09 DIAGNOSIS — Z01.84 IMMUNITY STATUS TESTING: ICD-10-CM

## 2024-02-09 LAB
MEV IGG SER QL IA: NORMAL
MUV IGG SER QL IA: NORMAL
RUBV IGG SERPL IA-ACNC: 52.2 IU/ML
VZV IGG SER QL IA: NORMAL

## 2024-02-09 PROCEDURE — 36415 COLL VENOUS BLD VENIPUNCTURE: CPT

## 2024-02-09 PROCEDURE — 86762 RUBELLA ANTIBODY: CPT

## 2024-02-09 PROCEDURE — 86480 TB TEST CELL IMMUN MEASURE: CPT

## 2024-02-09 PROCEDURE — 86765 RUBEOLA ANTIBODY: CPT

## 2024-02-09 PROCEDURE — 86787 VARICELLA-ZOSTER ANTIBODY: CPT

## 2024-02-09 PROCEDURE — 86735 MUMPS ANTIBODY: CPT

## 2024-02-11 LAB
GAMMA INTERFERON BACKGROUND BLD IA-ACNC: 0.2 IU/ML
M TB IFN-G BLD-IMP: POSITIVE
M TB IFN-G CD4+ BCKGRND COR BLD-ACNC: 1.64 IU/ML
M TB IFN-G CD4+ BCKGRND COR BLD-ACNC: 1.67 IU/ML
MITOGEN IGNF BCKGRD COR BLD-ACNC: 7.51 IU/ML

## 2024-02-28 ENCOUNTER — OFFICE VISIT (OUTPATIENT)
Dept: FAMILY MEDICINE CLINIC | Facility: CLINIC | Age: 44
End: 2024-02-28

## 2024-02-28 VITALS
RESPIRATION RATE: 18 BRPM | TEMPERATURE: 97.9 F | DIASTOLIC BLOOD PRESSURE: 78 MMHG | HEIGHT: 61 IN | WEIGHT: 133 LBS | OXYGEN SATURATION: 98 % | SYSTOLIC BLOOD PRESSURE: 122 MMHG | BODY MASS INDEX: 25.11 KG/M2 | HEART RATE: 95 BPM

## 2024-02-28 DIAGNOSIS — Z02.89 ENCOUNTER FOR COMPLETION OF FORM WITH PATIENT: ICD-10-CM

## 2024-02-28 DIAGNOSIS — R76.12 POSITIVE QUANTIFERON-TB GOLD TEST: ICD-10-CM

## 2024-02-28 DIAGNOSIS — Z12.31 BREAST CANCER SCREENING BY MAMMOGRAM: ICD-10-CM

## 2024-02-28 DIAGNOSIS — Z86.11 HX OF TUBERCULOSIS: ICD-10-CM

## 2024-02-28 DIAGNOSIS — Z23 ENCOUNTER FOR IMMUNIZATION: Primary | ICD-10-CM

## 2024-02-28 DIAGNOSIS — M05.79 RHEUMATOID ARTHRITIS INVOLVING MULTIPLE SITES WITH POSITIVE RHEUMATOID FACTOR (HCC): ICD-10-CM

## 2024-02-28 PROCEDURE — 90686 IIV4 VACC NO PRSV 0.5 ML IM: CPT | Performed by: NURSE PRACTITIONER

## 2024-02-28 PROCEDURE — 90471 IMMUNIZATION ADMIN: CPT | Performed by: NURSE PRACTITIONER

## 2024-02-28 PROCEDURE — 99214 OFFICE O/P EST MOD 30 MIN: CPT | Performed by: NURSE PRACTITIONER

## 2024-02-28 NOTE — PATIENT INSTRUCTIONS
Meal Planning with the Plate Method   AMBULATORY CARE:   Meal planning with the plate method  is a way for people with diabetes to plan meals. The plate method can help you eat the right amount of carbohydrates and keep your blood sugar levels under control. Carbohydrates naturally raise your blood sugar level. Your blood sugar level can rise too high if you eat too much carbohydrate at one time. Carbohydrates are found in starches (bread, cereal, starchy vegetables, and beans), fruit, milk, yogurt, and sweets.  How to use the plate method to plan meals:   Draw an imaginary line down the middle of a 9-inch dinner plate.  On one side, draw another line to divide that section in half. Your plate will have 3 sections.    Fill the largest section of your plate with non-starchy vegetables.  These include broccoli, spinach, cucumbers, peppers, cauliflower, and tomatoes.    Add a carbohydrate to 1 of the small sections of your plate.  Examples are pasta, rice, whole-grain bread, tortillas, corn, potatoes, and beans. Your plan may allow a serving of low-fat dairy or fruit for a carbohydrate.    Add meat or another source of protein to the other small section of your plate.  Examples include chicken or turkey without skin, fish, lean beef or pork, low-fat cheese, tofu, or eggs.         Add a low-calorie or calorie-free drink.  Examples include water or unsweetened tea or coffee.       Serving sizes of foods:   Non-starchy vegetables:      ½ cup of cooked vegetables or 1 cup of raw vegetables    ½ cup of vegetable juice    Starches:      1 ounce of whole-wheat bread or 1 small (6 inch) flour or corn tortilla    1 small (4 inch) pancake (about ¼ inch thick)    ¾ cup of dry, unsweetened, whole-grain ready-to-eat cereal or ¼ cup of low-fat granola    ½ cup of cooked cereal or oatmeal    ? cup of rice or pasta    ½ cup of corn, green peas, sweet potatoes, or mashed potatoes    ½ cup of cooked beans and peas (dionicio villagomez,  kidney, white, split, black-eyed)    Meat and other protein sources:      3 to 4 ounces of any lean meat, fish, or poultry    ½ cup of tofu or tempeh    1 large egg    1½ ounces (about 2 tablespoons) of nuts or 2 tablespoons of peanut butter    Fruit:      1 small piece of fresh fruit     ½ cup of canned or fresh fruit or unsweetened fruit juice    ¼ cup of dried fruit    Milk and yogurt:      1 cup (8 ounces) of skim or 1% milk    ¾ cup (6 ounces) of plain, non-fat yogurt    Other healthy nutrition tips:   Limit salt and sugar.  Choose and prepare foods and drinks with less salt and added sugars. Use the nutrition information on food labels to help you make healthy choices. The percent daily value listed on the food label tells you whether a food is low or high in certain nutrients. A percent daily value of 5% or less means that the food is low in a nutrient. A percent daily value of 20% or more means that the food is high in a nutrient.         Choose healthy fats.  Choose healthy fats such as polyunsaturated and monounsaturated fats in place of unhealthy fats. Healthy fats are found in vegetable oils such as soybean, corn, canola, olive, and sunflower oil. Unhealthy fats are saturated fats, trans fats, and cholesterol. Unhealthy fats are found in shortening, butter, stick margarine, and animal fat.         Ask your healthcare provider if alcohol is okay for you.  Generally, men 65 or older and women should limit alcohol to 1 drink within 24 hours and 7 within 1 week. Men 21 to 64 years should limit alcohol to 2 drinks a day and 14 within 1 week. Your provider can tell you how many drinks are okay for you within 24 hours or within 1 week. A drink of alcohol is 12 ounces of beer, 5 ounces of wine, or 1½ ounces of liquor. Always have food when you drink alcohol. Your blood sugar may fall to a low level if you drink when your stomach is empty.    © Copyright [employer] 2023 Information is for End User's use only and  may not be sold, redistributed or otherwise used for commercial purposes.  The above information is an  only. It is not intended as medical advice for individual conditions or treatments. Talk to your doctor, nurse or pharmacist before following any medical regimen to see if it is safe and effective for you.

## 2024-02-28 NOTE — ASSESSMENT & PLAN NOTE
Follows with rheumatology of [facility]   Currently receiving Etanercept injections weekly with good results

## 2024-02-28 NOTE — PROGRESS NOTES
Name: Mame Rubio      : 1980      MRN: [id number]  Encounter Provider: GAGAN Juarez  Encounter Date: 2024   Encounter department: [facility]    Assessment & Plan     1. Encounter for immunization  -     influenza vaccine, quadrivalent, 0.5 mL, preservative-free, for adult and pediatric patients 6 mos+ (AFLURIA, FLUARIX, FLULAVAL, FLUZONE)    2. Positive QuantiFERON-TB Gold test  -     XR chest pa & lateral; Future; Expected date: 2024    3. Breast cancer screening by mammogram  -     Mammo screening bilateral w 3d & cad; Future    4. Rheumatoid arthritis involving multiple sites with positive rheumatoid factor (HCC)  Assessment & Plan:  Follows with rheumatology of [facility]   Currently receiving Etanercept injections weekly with good results       5. Hx of tuberculosis  Assessment & Plan:  Patient reports that she was treated in  in [place]- [facility] x 9 months         6. Encounter for completion of form with patient      BMI Counseling: Body mass index is 25.13 kg/m². The BMI is above normal. Nutrition recommendations include decreasing portion sizes, encouraging healthy choices of fruits and vegetables, decreasing fast food intake, consuming healthier snacks and limiting drinks that contain sugar. Rationale for BMI follow-up plan is due to patient being overweight or obese.         Subjective     Patient is a 44 YO female with past medical hx of LTBI and rheumatoid arthritis who presents today for forms to be completed. 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 She requires titers and TB clearance. She otherwise has no concerns.     The following portions of the patient's history were reviewed and updated as appropriate: allergies, current medications, past family history, past medical history, past social history, past surgical history and problem list.        Review of Systems   Constitutional:  Negative for activity change, appetite change,  chills, fatigue, fever and unexpected weight change.   HENT:  Negative for hearing loss, nosebleeds, sinus pain, sneezing, sore throat and trouble swallowing.    Eyes:  Negative for photophobia and visual disturbance.   Respiratory:  Negative for cough, chest tightness, shortness of breath and wheezing.    Cardiovascular:  Negative for chest pain, palpitations and leg swelling.   Gastrointestinal:  Negative for abdominal pain, constipation, nausea and vomiting.   Genitourinary:  Negative for decreased urine volume, difficulty urinating, dysuria, flank pain, genital sores, hematuria and urgency.   Musculoskeletal:  Negative for back pain and gait problem.   Skin:  Negative for pallor, rash and wound.   Neurological:  Negative for dizziness, seizures, syncope, weakness, numbness and headaches.   Hematological:  Negative for adenopathy. Does not bruise/bleed easily.   Psychiatric/Behavioral:  Negative for confusion, hallucinations, self-injury, sleep disturbance and suicidal ideas. The patient is not nervous/anxious.        Past Medical History:   Diagnosis Date    Diabetes mellitus (HCC)     Varicosities of leg      Past Surgical History:   Procedure Laterality Date    APPENDECTOMY       Family History   Problem Relation Age of Onset    No Known Problems Mother     No Known Problems Father     No Known Problems Sister     No Known Problems Sister     No Known Problems Sister     No Known Problems Daughter     No Known Problems Maternal Grandmother     No Known Problems Maternal Grandfather     No Known Problems Paternal Grandmother     No Known Problems Paternal Grandfather     No Known Problems Maternal Aunt     No Known Problems Paternal Aunt     No Known Problems Paternal Aunt     No Known Problems Paternal Aunt     No Known Problems Paternal Aunt     No Known Problems Paternal Aunt     No Known Problems Paternal Aunt      Social History     Socioeconomic History    Marital status: /Civil Union     Spouse  name: None    Number of children: None    Years of education: None    Highest education level: None   Occupational History    None   Tobacco Use    Smoking status: Never    Smokeless tobacco: Never   Vaping Use    Vaping status: Never Used   Substance and Sexual Activity    Alcohol use: No    Drug use: No    Sexual activity: Yes     Partners: Male     Birth control/protection: None, I.U.D.   Other Topics Concern    None   Social History Narrative    None     Social Determinants of Health     Financial Resource Strain: Low Risk  (9/22/2023)    Overall Financial Resource Strain (CARDIA)     Difficulty of Paying Living Expenses: Not hard at all   Food Insecurity: No Food Insecurity (9/22/2023)    Hunger Vital Sign     Worried About Running Out of Food in the Last Year: Never true     Ran Out of Food in the Last Year: Never true   Transportation Needs: No Transportation Needs (9/22/2023)    PRAPARE - Transportation     Lack of Transportation (Medical): No     Lack of Transportation (Non-Medical): No   Physical Activity: Not on file   Stress: Not on file   Social Connections: Not on file   Intimate Partner Violence: Not on file   Housing Stability: Not on file     Current Outpatient Medications on File Prior to Visit   Medication Sig    benzocaine-menthol-lanolin-aloe (DERMOPLAST) 20-0.5 % topical spray Apply 1 application topically 4 (four) times a day as needed for mild pain for up to 30 days    docusate sodium (COLACE) 100 mg capsule Take 1 capsule by mouth 2 (two) times a day for 30 days    Etanercept (Enbrel Mini) 50 MG/ML SOCT Inject 50 mg under the skin Once a week    folic acid (FOLVITE) 1 mg tablet Take 1 mg by mouth daily (Patient not taking: Reported on 11/4/2021)    hydrocortisone 1 % cream Apply 1 application topically as needed for irritation for up to 30 days    levonorgestrel (MIRENA) 20 MCG/24HR IUD 1 each by Intrauterine route once    witch hazel-glycerin (TUCKS) topical pad Apply 1 pad topically as  "needed for irritation for up to 30 days    [DISCONTINUED] acetaminophen (TYLENOL) 325 mg tablet Take 2 tablets by mouth every 4 (four) hours as needed for headaches (Patient not taking: Reported on 7/6/2022)    [DISCONTINUED] ibuprofen (MOTRIN) 600 mg tablet Take 1 tablet by mouth every 6 (six) hours as needed for mild pain for up to 30 days    [DISCONTINUED] Prenatal Vit-DSS-Fe Fum-FA (PRENATAL 19) tablet Take by mouth (Patient not taking: Reported on 9/27/2021)    [DISCONTINUED] triamcinolone (KENALOG) 0.1 % ointment Apply topically 2 (two) times a day (Patient not taking: Reported on 9/22/2023)     Allergies   Allergen Reactions    Latex      Immunization History   Administered Date(s) Administered    INFLUENZA 10/25/2010, 10/30/2017    Influenza Quadrivalent Preservative Free 3 years and older IM 11/15/2016    Influenza, injectable, quadrivalent, preservative free 0.5 mL 12/22/2022, 02/28/2024    Influenza, recombinant, quadrivalent,injectable, preservative free 09/27/2021    Pneumococcal Conjugate Vaccine 20-valent (Pcv20), Polysace 06/14/2022    Tdap 04/12/2017       Objective     /78 (BP Location: Right arm, Patient Position: Sitting, Cuff Size: Standard)   Pulse 95   Temp 97.9 °F (36.6 °C) (Temporal)   Resp 18   Ht 5' 1\" (1.549 m)   Wt 60.3 kg (133 lb)   SpO2 98%   BMI 25.13 kg/m²     Physical Exam  Vitals and nursing note reviewed.   Constitutional:       General: She is not in acute distress.     Appearance: She is well-developed. She is not diaphoretic.   HENT:      Head: Normocephalic and atraumatic.      Right Ear: External ear normal.      Left Ear: External ear normal.   Eyes:      General:         Right eye: No discharge.         Left eye: No discharge.      Conjunctiva/sclera: Conjunctivae normal.   Cardiovascular:      Rate and Rhythm: Normal rate and regular rhythm.   Pulmonary:      Effort: Pulmonary effort is normal. No respiratory distress.      Breath sounds: Normal breath sounds. " No wheezing.   Abdominal:      General: Bowel sounds are normal. There is no distension.      Palpations: Abdomen is soft.      Tenderness: There is no abdominal tenderness.   Musculoskeletal:         General: No deformity. Normal range of motion.      Cervical back: Normal range of motion and neck supple.   Lymphadenopathy:      Cervical: No cervical adenopathy.   Skin:     General: Skin is warm and dry.      Capillary Refill: Capillary refill takes less than 2 seconds.      Findings: No rash.   Neurological:      General: No focal deficit present.      Mental Status: She is alert and oriented to person, place, and time.      Sensory: No sensory deficit.      Coordination: Coordination normal.      Deep Tendon Reflexes: Reflexes normal.   Psychiatric:         Mood and Affect: Mood normal.         Behavior: Behavior normal.       GAGAN Juarez

## 2024-03-13 ENCOUNTER — APPOINTMENT (OUTPATIENT)
Dept: LAB | Facility: HOSPITAL | Age: 44
End: 2024-03-13

## 2024-03-13 ENCOUNTER — HOSPITAL ENCOUNTER (OUTPATIENT)
Dept: RADIOLOGY | Facility: HOSPITAL | Age: 44
Discharge: HOME | End: 2024-03-13

## 2024-03-13 DIAGNOSIS — R76.12 POSITIVE QUANTIFERON-TB GOLD TEST: ICD-10-CM

## 2024-03-13 DIAGNOSIS — Z11.1 TUBERCULOSIS SCREENING: ICD-10-CM

## 2024-03-13 DIAGNOSIS — Z01.84 IMMUNITY STATUS TESTING: ICD-10-CM

## 2024-03-13 PROCEDURE — 71046 X-RAY EXAM CHEST 2 VIEWS: CPT

## 2024-03-20 ENCOUNTER — TELEPHONE (OUTPATIENT)
Dept: FAMILY MEDICINE CLINIC | Facility: CLINIC | Age: 44
End: 2024-03-20

## 2024-03-20 NOTE — TELEPHONE ENCOUNTER
Pt called back asking if the forms completed.     Pt told to give the provider more time because the provider is with other patients today.

## 2024-03-22 NOTE — TELEPHONE ENCOUNTER
Pt called nurse line requesting status of previous form. Pt is upset due that is been waiting for form for 2 weeks for it. Pt states will come in to the office today because she needs form ASAP.     Please advise, thank you

## 2024-03-22 NOTE — TELEPHONE ENCOUNTER
Form given to pt without xray results. Results not in yet.    Pt requested to have results faxed or emailed to [email or web address]